# Patient Record
Sex: MALE | Race: WHITE | Employment: FULL TIME | URBAN - METROPOLITAN AREA
[De-identification: names, ages, dates, MRNs, and addresses within clinical notes are randomized per-mention and may not be internally consistent; named-entity substitution may affect disease eponyms.]

---

## 2020-02-18 ENCOUNTER — OFFICE VISIT (OUTPATIENT)
Dept: PODIATRY CLINIC | Facility: CLINIC | Age: 45
End: 2020-02-18
Payer: COMMERCIAL

## 2020-02-18 DIAGNOSIS — M79.671 RIGHT FOOT PAIN: ICD-10-CM

## 2020-02-18 DIAGNOSIS — M77.41 METATARSALGIA OF BOTH FEET: ICD-10-CM

## 2020-02-18 DIAGNOSIS — M77.42 METATARSALGIA OF BOTH FEET: ICD-10-CM

## 2020-02-18 DIAGNOSIS — M79.672 LEFT FOOT PAIN: Primary | ICD-10-CM

## 2020-02-18 DIAGNOSIS — M20.5X1 HALLUX LIMITUS OF RIGHT FOOT: ICD-10-CM

## 2020-02-18 PROCEDURE — 73620 X-RAY EXAM OF FOOT: CPT | Performed by: PODIATRIST

## 2020-02-18 PROCEDURE — 99213 OFFICE O/P EST LOW 20 MIN: CPT | Performed by: PODIATRIST

## 2020-02-18 RX ORDER — LORAZEPAM 0.5 MG/1
TABLET ORAL AS NEEDED
COMMUNITY
Start: 2019-12-04

## 2020-02-18 RX ORDER — QUETIAPINE 100 MG/1
100 TABLET, FILM COATED ORAL NIGHTLY
COMMUNITY
End: 2020-07-29 | Stop reason: ALTCHOICE

## 2020-02-18 RX ORDER — CLONAZEPAM 1 MG/1
0.5 TABLET ORAL 2 TIMES DAILY
COMMUNITY
End: 2020-07-29 | Stop reason: ALTCHOICE

## 2020-02-18 RX ORDER — FLUOXETINE HYDROCHLORIDE 40 MG/1
40 CAPSULE ORAL DAILY
COMMUNITY

## 2020-02-18 NOTE — PROGRESS NOTES
Crissie Hatchet is a 38year old male. Patient presents with:   Foot Pain: bilateral foot pain - is off work now since he is having elbow surgery tomorrow, has been walking to keep in shape and his feet had been hurting - has old orthotics, but are not prov headaches  MUSCULO: denies arthritis, back pain      EXAM:   There were no vitals taken for this visit. Physical Exam  GENERAL: well developed, well nourished, in no apparent distress  EXTREMITIES:   1. Integument: Normal skin temperature and turgor  2.  V

## 2020-03-24 ENCOUNTER — OFFICE VISIT (OUTPATIENT)
Dept: PODIATRY CLINIC | Facility: CLINIC | Age: 45
End: 2020-03-24
Payer: COMMERCIAL

## 2020-03-24 DIAGNOSIS — M79.671 RIGHT FOOT PAIN: ICD-10-CM

## 2020-03-24 DIAGNOSIS — M77.41 METATARSALGIA OF BOTH FEET: ICD-10-CM

## 2020-03-24 DIAGNOSIS — M77.42 METATARSALGIA OF BOTH FEET: ICD-10-CM

## 2020-03-24 DIAGNOSIS — M79.672 LEFT FOOT PAIN: Primary | ICD-10-CM

## 2020-03-24 DIAGNOSIS — M20.5X1 HALLUX LIMITUS OF RIGHT FOOT: ICD-10-CM

## 2020-03-24 PROCEDURE — L3000 FT INSERT UCB BERKELEY SHELL: HCPCS | Performed by: PODIATRIST

## 2020-03-24 PROCEDURE — 29799 UNLISTED PX CASTING/STRPG: CPT | Performed by: PODIATRIST

## 2020-03-24 NOTE — PROGRESS NOTES
Cathy Mondragon is a 38year old male. Patient presents with:  Orthotic Status: here to get casted for orthotics.         HPI:     Presents today to be casted for his orthotic      Allergies: Vancomycin   Current Outpatient Medications   Medication Sig Disp Dorsalis pedis two out of four bilateral and posterior tibial pulses two out of   four bilateral, capillary refill normal.   3. Musculoskeletal: All muscle groups are graded 5 out of 5 in the foot and ankle.    4. Neurological: Normal sharp dull sensation;

## 2020-05-29 ENCOUNTER — TELEPHONE (OUTPATIENT)
Dept: PODIATRY CLINIC | Facility: CLINIC | Age: 45
End: 2020-05-29

## 2020-05-29 NOTE — TELEPHONE ENCOUNTER
Contacted patient to let her know orthotics are in the office. he will stop by the office and pick them up.

## 2020-06-01 ENCOUNTER — TELEPHONE (OUTPATIENT)
Dept: PODIATRY CLINIC | Facility: CLINIC | Age: 45
End: 2020-06-01

## 2020-06-01 NOTE — TELEPHONE ENCOUNTER
Patient stopped into the office to  his custom molded orthotics. He will return to the office for followup if he has any problems with the devices.

## 2020-07-29 ENCOUNTER — OFFICE VISIT (OUTPATIENT)
Dept: PODIATRY CLINIC | Facility: CLINIC | Age: 45
End: 2020-07-29
Payer: COMMERCIAL

## 2020-07-29 VITALS — SYSTOLIC BLOOD PRESSURE: 132 MMHG | DIASTOLIC BLOOD PRESSURE: 76 MMHG

## 2020-07-29 DIAGNOSIS — M79.672 LEFT FOOT PAIN: Primary | ICD-10-CM

## 2020-07-29 DIAGNOSIS — M72.2 PLANTAR FASCIITIS OF LEFT FOOT: ICD-10-CM

## 2020-07-29 PROCEDURE — 3075F SYST BP GE 130 - 139MM HG: CPT | Performed by: PODIATRIST

## 2020-07-29 PROCEDURE — 3078F DIAST BP <80 MM HG: CPT | Performed by: PODIATRIST

## 2020-07-29 PROCEDURE — 20550 NJX 1 TENDON SHEATH/LIGAMENT: CPT | Performed by: PODIATRIST

## 2020-07-29 RX ORDER — DEXAMETHASONE SODIUM PHOSPHATE 4 MG/ML
2 INJECTION, SOLUTION INTRA-ARTICULAR; INTRALESIONAL; INTRAMUSCULAR; INTRAVENOUS; SOFT TISSUE ONCE
Status: COMPLETED | OUTPATIENT
Start: 2020-07-29 | End: 2020-07-29

## 2020-08-05 NOTE — PROGRESS NOTES
Christophe Benson is a 41year old male. Patient presents with:  Heel Pain: Left heel pain. Patient having pain, 6/10. Orthotics are not helping. HPI:     This 73-year-old male patient presents to clinic today complaining of left heel pain.   He rates apparent distress  EXTREMITIES:   1. Integument: Normal skin temperature and turgor  2.  Vascular: Dorsalis pedis two out of four bilateral and posterior tibial pulses two out of   four bilateral, capillary refill normal.   4. Neurological: Normal sharp dul Fiona Guerrier, AME    7/29/2020

## 2020-08-26 ENCOUNTER — OFFICE VISIT (OUTPATIENT)
Dept: PODIATRY CLINIC | Facility: CLINIC | Age: 45
End: 2020-08-26
Payer: COMMERCIAL

## 2020-08-26 VITALS — SYSTOLIC BLOOD PRESSURE: 114 MMHG | DIASTOLIC BLOOD PRESSURE: 68 MMHG

## 2020-08-26 DIAGNOSIS — M72.2 PLANTAR FASCIITIS OF LEFT FOOT: Primary | ICD-10-CM

## 2020-08-26 DIAGNOSIS — M79.672 LEFT FOOT PAIN: ICD-10-CM

## 2020-08-26 PROCEDURE — 3074F SYST BP LT 130 MM HG: CPT | Performed by: PODIATRIST

## 2020-08-26 PROCEDURE — 20550 NJX 1 TENDON SHEATH/LIGAMENT: CPT | Performed by: PODIATRIST

## 2020-08-26 PROCEDURE — 3078F DIAST BP <80 MM HG: CPT | Performed by: PODIATRIST

## 2020-08-26 RX ORDER — CLONAZEPAM 1 MG/1
TABLET ORAL
COMMUNITY
Start: 2019-07-01

## 2020-08-26 RX ORDER — FAMOTIDINE 40 MG/1
TABLET, FILM COATED ORAL 2 TIMES DAILY
COMMUNITY
Start: 2020-08-18

## 2020-08-26 RX ORDER — ACETAMINOPHEN 160 MG
TABLET,DISINTEGRATING ORAL
COMMUNITY
Start: 2020-02-07

## 2020-08-26 RX ORDER — DEXAMETHASONE SODIUM PHOSPHATE 4 MG/ML
2 INJECTION, SOLUTION INTRA-ARTICULAR; INTRALESIONAL; INTRAMUSCULAR; INTRAVENOUS; SOFT TISSUE ONCE
Status: COMPLETED | OUTPATIENT
Start: 2020-08-26 | End: 2020-08-26

## 2020-08-26 RX ORDER — TRAZODONE HYDROCHLORIDE 150 MG/1
150 TABLET ORAL NIGHTLY
COMMUNITY
Start: 2020-08-18

## 2020-08-26 NOTE — PROGRESS NOTES
Ana Pink is a 41year old male. Patient presents with: Follow - Up: left heel pain - pain scale at worst 8/10. HPI:     This 29-year-old male patient returns to clinic today for follow-up of plantar fascial pain in his left foot.   He returns tod Number of children: Not on file      Years of education: Not on file      Highest education level: Not on file    Tobacco Use      Smoking status: Never Smoker      Smokeless tobacco: Never Used    Substance and Sexual Activity      Alcohol use: Never THERAPY & REHAB      Plan: Discussed the clinical findings with the patient along with the treatment options. Answered all of his questions to his understanding and satisfaction.   A cortisone injection (2) was given to the trigger point area on his left h

## 2020-08-26 NOTE — PROCEDURES
Per Dr Suri Hernandes to draw up .5ml of dexamethasone sodium phosphate, .5ml kenalog-10 and .5ml sesorcaine 0.5% for a left foot injection. Pt had left office before I could obtain post injection vitals.

## 2020-08-27 ENCOUNTER — TELEPHONE (OUTPATIENT)
Dept: PHYSICAL THERAPY | Age: 45
End: 2020-08-27

## 2020-09-08 ENCOUNTER — OFFICE VISIT (OUTPATIENT)
Dept: PHYSICAL THERAPY | Facility: HOSPITAL | Age: 45
End: 2020-09-08
Attending: PODIATRIST
Payer: COMMERCIAL

## 2020-09-08 DIAGNOSIS — M72.2 PLANTAR FASCIITIS OF LEFT FOOT: ICD-10-CM

## 2020-09-08 DIAGNOSIS — M79.672 LEFT FOOT PAIN: ICD-10-CM

## 2020-09-08 PROCEDURE — 97110 THERAPEUTIC EXERCISES: CPT

## 2020-09-08 PROCEDURE — 97162 PT EVAL MOD COMPLEX 30 MIN: CPT

## 2020-09-08 NOTE — PROGRESS NOTES
LOWER EXTREMITY EVALUATION:   Referring Physician: Dr. Myra Miller  Diagnosis: L plantar fasciitis     Date of Service: 9/8/2020     PATIENT SUMMARY   Doreatha Meckel is a 41year old male who presents to therapy today with complaints of L foot pain since mid-J correctly without reported pain. Skilled Physical Therapy is medically necessary to address the above impairments and reach functional goals.      Precautions:  None  OBJECTIVE:   Observation: unremarkable  Palpation: tender to distal plantar surface of zo without pain. Frequency / Duration: Patient will be seen for 1-2 x/week or a total of 16 visits over a 90 day period.  Treatment will include: Gait training, Manual Therapy, Neuromuscular Re-education, Therapeutic Activities, Therapeutic Exercise and Jaun

## 2020-09-11 ENCOUNTER — TELEPHONE (OUTPATIENT)
Dept: PHYSICAL THERAPY | Facility: HOSPITAL | Age: 45
End: 2020-09-11

## 2020-09-11 ENCOUNTER — APPOINTMENT (OUTPATIENT)
Dept: PHYSICAL THERAPY | Facility: HOSPITAL | Age: 45
End: 2020-09-11
Attending: PODIATRIST
Payer: COMMERCIAL

## 2020-09-15 ENCOUNTER — OFFICE VISIT (OUTPATIENT)
Dept: PHYSICAL THERAPY | Facility: HOSPITAL | Age: 45
End: 2020-09-15
Attending: PODIATRIST
Payer: COMMERCIAL

## 2020-09-15 PROCEDURE — 97140 MANUAL THERAPY 1/> REGIONS: CPT

## 2020-09-15 PROCEDURE — 97110 THERAPEUTIC EXERCISES: CPT

## 2020-09-15 NOTE — PROGRESS NOTES
Dx: L plantar fasciitis         Insurance (Authorized # of Visits):  [facility] 48 visits           Authorizing Physician: Dr. Jessica Alvarado  Next MD visit: none scheduled  Fall Risk: standard         Precautions: n/a             Subjective: Patient reports that last Tues

## 2020-09-16 ENCOUNTER — OFFICE VISIT (OUTPATIENT)
Dept: PODIATRY CLINIC | Facility: CLINIC | Age: 45
End: 2020-09-16
Payer: COMMERCIAL

## 2020-09-16 DIAGNOSIS — M79.672 LEFT FOOT PAIN: ICD-10-CM

## 2020-09-16 DIAGNOSIS — M72.2 PLANTAR FASCIITIS OF LEFT FOOT: Primary | ICD-10-CM

## 2020-09-16 PROCEDURE — 99213 OFFICE O/P EST LOW 20 MIN: CPT | Performed by: PODIATRIST

## 2020-09-16 NOTE — PROGRESS NOTES
Eloy Tello is a 41year old male. Patient presents with: Follow - Up: left heel, felt sharp shooting pain twice this week - pain scale 5/10.     HPI:     This 46-year-old male patient returns to clinic today for follow-up of plantar fascial pain in hi mid back      History reviewed. No pertinent family history.    Social History    Socioeconomic History      Marital status:       Spouse name: Not on file      Number of children: Not on file      Years of education: Not on file      Highest educati his home exercise program as developed through his physical therapy.   He is to continue to ice the affected area as directed, wear good supportive shoes with his custom orthotics, wear the night splint on his left nightly as directed, and to refrain from a

## 2020-09-18 ENCOUNTER — APPOINTMENT (OUTPATIENT)
Dept: PHYSICAL THERAPY | Facility: HOSPITAL | Age: 45
End: 2020-09-18
Attending: PODIATRIST
Payer: COMMERCIAL

## 2020-09-22 ENCOUNTER — OFFICE VISIT (OUTPATIENT)
Dept: PHYSICAL THERAPY | Facility: HOSPITAL | Age: 45
End: 2020-09-22
Attending: PODIATRIST
Payer: COMMERCIAL

## 2020-09-22 PROCEDURE — 97110 THERAPEUTIC EXERCISES: CPT

## 2020-09-22 PROCEDURE — 97140 MANUAL THERAPY 1/> REGIONS: CPT

## 2020-09-22 NOTE — PROGRESS NOTES
Dx: L plantar fasciitis         Insurance (Authorized # of Visits):  [facility] 48 visits           Authorizing Physician: Dr. Carl Ureña  Next MD visit: none scheduled  Fall Risk: standard         Precautions: n/a             Subjective: Heel pain comes on with sittin total                    HEP: calf stretching, SLS ball toss, slow heel raises    Charges: manual x1, therex x2       Total Timed Treatment: 44 min  Total Treatment Time: 44 min

## 2020-09-25 ENCOUNTER — OFFICE VISIT (OUTPATIENT)
Dept: PHYSICAL THERAPY | Facility: HOSPITAL | Age: 45
End: 2020-09-25
Attending: PODIATRIST
Payer: COMMERCIAL

## 2020-09-25 PROCEDURE — 97110 THERAPEUTIC EXERCISES: CPT

## 2020-09-25 PROCEDURE — 97140 MANUAL THERAPY 1/> REGIONS: CPT

## 2020-09-25 NOTE — PROGRESS NOTES
Dx: L plantar fasciitis         Insurance (Authorized # of Visits):  [facility] 48 visits           Authorizing Physician: Dr. Yomaira Anderson  Next MD visit: none scheduled  Fall Risk: standard         Precautions: n/a             Subjective: Patient is pretty sore this mo DKTC x20  Supine SKTC stretching 10 sec holds x3 BL  Standing trunk rotations with stick behind back x2'  Flexion stretch on railing x10  Calf stretch on incline 2x30 sec Therex:  SB lower trunk rotations x2'  SB DKTC x20  Calf stretch on incline 2x30 sec

## 2020-09-28 ENCOUNTER — TELEPHONE (OUTPATIENT)
Dept: PHYSICAL THERAPY | Facility: HOSPITAL | Age: 45
End: 2020-09-28

## 2020-09-29 ENCOUNTER — APPOINTMENT (OUTPATIENT)
Dept: PHYSICAL THERAPY | Facility: HOSPITAL | Age: 45
End: 2020-09-29
Attending: PODIATRIST
Payer: COMMERCIAL

## 2020-10-02 ENCOUNTER — APPOINTMENT (OUTPATIENT)
Dept: PHYSICAL THERAPY | Facility: HOSPITAL | Age: 45
End: 2020-10-02
Attending: PODIATRIST
Payer: COMMERCIAL

## 2020-10-08 ENCOUNTER — APPOINTMENT (OUTPATIENT)
Dept: LAB | Facility: HOSPITAL | Age: 45
End: 2020-10-08
Attending: PHYSICIAN ASSISTANT
Payer: COMMERCIAL

## 2020-10-08 ENCOUNTER — TELEPHONE (OUTPATIENT)
Dept: PHYSICAL THERAPY | Facility: HOSPITAL | Age: 45
End: 2020-10-08

## 2020-10-09 ENCOUNTER — APPOINTMENT (OUTPATIENT)
Dept: PHYSICAL THERAPY | Facility: HOSPITAL | Age: 45
End: 2020-10-09
Attending: PODIATRIST
Payer: COMMERCIAL

## 2020-10-13 ENCOUNTER — APPOINTMENT (OUTPATIENT)
Dept: PHYSICAL THERAPY | Facility: HOSPITAL | Age: 45
End: 2020-10-13
Attending: PODIATRIST
Payer: COMMERCIAL

## 2020-10-15 ENCOUNTER — TELEPHONE (OUTPATIENT)
Dept: PHYSICAL THERAPY | Facility: HOSPITAL | Age: 45
End: 2020-10-15

## 2020-10-16 ENCOUNTER — APPOINTMENT (OUTPATIENT)
Dept: PHYSICAL THERAPY | Facility: HOSPITAL | Age: 45
End: 2020-10-16
Attending: PODIATRIST
Payer: COMMERCIAL

## 2020-10-23 ENCOUNTER — OFFICE VISIT (OUTPATIENT)
Dept: PODIATRY CLINIC | Facility: CLINIC | Age: 45
End: 2020-10-23
Payer: COMMERCIAL

## 2020-10-23 DIAGNOSIS — M72.2 PLANTAR FASCIITIS OF LEFT FOOT: Primary | ICD-10-CM

## 2020-10-23 DIAGNOSIS — M79.672 LEFT FOOT PAIN: ICD-10-CM

## 2020-10-23 PROCEDURE — 99213 OFFICE O/P EST LOW 20 MIN: CPT | Performed by: PODIATRIST

## 2020-10-23 NOTE — PROGRESS NOTES
Pierre Khan is a 41year old male. Patient presents with: Follow - Up: left foot feel alot better - pain scale 3/10. HPI:     This 42-year-old male patient returns to clinic today for follow-up of plantar fascial pain in his left foot.    He returns Frequency: Never      Drug use: Never          REVIEW OF SYSTEMS:   Review of Systems    Today reviewed systems as documented below  GENERAL HEALTH: feels well otherwise  SKIN: denies any unusual skin lesions or rashes  RESPIRATORY: denies shortness of br symptoms/pain. He may return to work without restriction as long as he has no issues. We will fill out the necessary paperwork for work which he states he will . Filled out the required paperwork for work.   He will monitor the affected area and w

## 2020-11-03 ENCOUNTER — TELEPHONE (OUTPATIENT)
Dept: PHYSICAL THERAPY | Age: 45
End: 2020-11-03

## 2020-11-20 ENCOUNTER — OFFICE VISIT (OUTPATIENT)
Dept: PODIATRY CLINIC | Facility: CLINIC | Age: 45
End: 2020-11-20
Payer: COMMERCIAL

## 2020-11-20 VITALS — DIASTOLIC BLOOD PRESSURE: 72 MMHG | SYSTOLIC BLOOD PRESSURE: 128 MMHG

## 2020-11-20 DIAGNOSIS — M72.2 PLANTAR FASCIITIS OF LEFT FOOT: ICD-10-CM

## 2020-11-20 DIAGNOSIS — M25.571 RIGHT ANKLE PAIN, UNSPECIFIED CHRONICITY: Primary | ICD-10-CM

## 2020-11-20 DIAGNOSIS — S86.301A INJURY OF PERONEAL TENDON OF RIGHT FOOT, INITIAL ENCOUNTER: ICD-10-CM

## 2020-11-20 DIAGNOSIS — S82.891A CLOSED FRACTURE OF RIGHT ANKLE, INITIAL ENCOUNTER: ICD-10-CM

## 2020-11-20 DIAGNOSIS — T14.8XXA FRACTURE: ICD-10-CM

## 2020-11-20 PROCEDURE — 3078F DIAST BP <80 MM HG: CPT | Performed by: PODIATRIST

## 2020-11-20 PROCEDURE — 99072 ADDL SUPL MATRL&STAF TM PHE: CPT | Performed by: PODIATRIST

## 2020-11-20 PROCEDURE — 20550 NJX 1 TENDON SHEATH/LIGAMENT: CPT | Performed by: PODIATRIST

## 2020-11-20 PROCEDURE — 3074F SYST BP LT 130 MM HG: CPT | Performed by: PODIATRIST

## 2020-11-20 PROCEDURE — 99213 OFFICE O/P EST LOW 20 MIN: CPT | Performed by: PODIATRIST

## 2020-11-20 RX ORDER — DEXAMETHASONE SODIUM PHOSPHATE 4 MG/ML
2 INJECTION, SOLUTION INTRA-ARTICULAR; INTRALESIONAL; INTRAMUSCULAR; INTRAVENOUS; SOFT TISSUE ONCE
Status: COMPLETED | OUTPATIENT
Start: 2020-11-20 | End: 2020-11-20

## 2020-11-20 RX ADMIN — DEXAMETHASONE SODIUM PHOSPHATE 2 MG: 4 INJECTION, SOLUTION INTRA-ARTICULAR; INTRALESIONAL; INTRAMUSCULAR; INTRAVENOUS; SOFT TISSUE at 16:44:00

## 2020-11-20 NOTE — PROCEDURES
Per Dr Okeefe Him to draw up .5ml of dexamethasone sodium phosphate, .5ml kenalog-10 and .5ml sesorcaine 0.5% for a left foot injection. Pt had left office before I could obtain post injection vitals.

## 2020-11-20 NOTE — PROGRESS NOTES
Doreatha Meckel is a 41year old male. Patient presents with: Follow - Up: left foot pain came back next weekend - last Sunday patient sprained his right ankle, it is swollen - pain scale 6/10.      HPI:     This 71-year-old male patient returns to clinic Hemorrhoids 2008    Havent had in while   • History of depression 2008    Last few years   • History of mental disorder 2008    Aniexty, Panic attacks   • Indigestion 2000    On and off not consistent   • Pain with bowel movements 2000    Once every few mo heartburn  NEURO: denies headaches  MUSCULO: denies arthritis, back pain      EXAM:   /72 (BP Location: Right arm, Patient Position: Sitting, Cuff Size: adult)   Physical Exam  GENERAL: well developed, well nourished, in no apparent distress  EXTREMI Future  -     MRI FOOT+ANKLE, RIGHT (ALL [facility] ONLY) (CPT=73719/05799); Future    Closed fracture of right ankle, initial encounter    Fracture  -     Cancel: MRI FOOT+ANKLE, RIGHT (ALL [facility] ONLY) (CPT=73719/01342);  Future  -     CREATININE, SERUM; Futu drive and be on his feet for long periods of time until we can reassess after we receive the results of his MRI studies. He is to wear good supportive athletic shoe on his left.   He will monitor the affected areas on both sides and will inform the office

## 2020-11-23 ENCOUNTER — TELEPHONE (OUTPATIENT)
Dept: PODIATRY CLINIC | Facility: CLINIC | Age: 45
End: 2020-11-23

## 2020-11-23 NOTE — TELEPHONE ENCOUNTER
Called patient. Answered all of his questions to his understanding and satisfaction. He will schedule MRI for first available appointment. Patient states mood is helping but that he still has pain in his foot. He states he has been icing and elevating.

## 2020-11-25 ENCOUNTER — TELEPHONE (OUTPATIENT)
Dept: PODIATRY CLINIC | Facility: CLINIC | Age: 45
End: 2020-11-25

## 2020-11-25 RX ORDER — HYDROCODONE BITARTRATE AND ACETAMINOPHEN 5; 325 MG/1; MG/1
TABLET ORAL
Qty: 15 TABLET | Refills: 0 | Status: SHIPPED | OUTPATIENT
Start: 2020-11-25 | End: 2021-02-02

## 2020-11-27 ENCOUNTER — TELEPHONE (OUTPATIENT)
Dept: PODIATRY CLINIC | Facility: CLINIC | Age: 45
End: 2020-11-27

## 2020-11-27 ENCOUNTER — HOSPITAL ENCOUNTER (OUTPATIENT)
Dept: MRI IMAGING | Facility: HOSPITAL | Age: 45
Discharge: HOME | End: 2020-11-27
Attending: PODIATRIST
Payer: COMMERCIAL

## 2020-11-27 DIAGNOSIS — S86.301A INJURY OF PERONEAL TENDON OF RIGHT FOOT, INITIAL ENCOUNTER: ICD-10-CM

## 2020-11-27 DIAGNOSIS — M25.571 RIGHT ANKLE PAIN, UNSPECIFIED CHRONICITY: ICD-10-CM

## 2020-11-27 DIAGNOSIS — T14.8XXA FRACTURE: ICD-10-CM

## 2020-11-27 PROCEDURE — 73721 MRI JNT OF LWR EXTRE W/O DYE: CPT | Performed by: PODIATRIST

## 2020-11-27 PROCEDURE — 73718 MRI LOWER EXTREMITY W/O DYE: CPT | Performed by: PODIATRIST

## 2020-11-27 NOTE — TELEPHONE ENCOUNTER
Called patient informed of the results of his MRI studies of his right ankle performed today which showed a high-grade partial-thickness tear of his anterior talofibular and calcaneofibular ligaments without fracture.   Discussed the treatment plan with the

## 2020-11-30 ENCOUNTER — TELEPHONE (OUTPATIENT)
Dept: PHYSICAL THERAPY | Facility: HOSPITAL | Age: 45
End: 2020-11-30

## 2020-12-04 ENCOUNTER — APPOINTMENT (OUTPATIENT)
Dept: PHYSICAL THERAPY | Facility: HOSPITAL | Age: 45
End: 2020-12-04
Attending: PODIATRIST
Payer: COMMERCIAL

## 2020-12-08 ENCOUNTER — APPOINTMENT (OUTPATIENT)
Dept: PHYSICAL THERAPY | Facility: HOSPITAL | Age: 45
End: 2020-12-08
Attending: PODIATRIST
Payer: COMMERCIAL

## 2020-12-11 ENCOUNTER — OFFICE VISIT (OUTPATIENT)
Dept: PODIATRY CLINIC | Facility: CLINIC | Age: 45
End: 2020-12-11
Payer: COMMERCIAL

## 2020-12-11 ENCOUNTER — APPOINTMENT (OUTPATIENT)
Dept: PHYSICAL THERAPY | Facility: HOSPITAL | Age: 45
End: 2020-12-11
Attending: PODIATRIST
Payer: COMMERCIAL

## 2020-12-11 DIAGNOSIS — M25.561 RIGHT KNEE PAIN, UNSPECIFIED CHRONICITY: ICD-10-CM

## 2020-12-11 DIAGNOSIS — M25.571 RIGHT ANKLE PAIN, UNSPECIFIED CHRONICITY: ICD-10-CM

## 2020-12-11 DIAGNOSIS — S93.401A SPRAIN OF RIGHT ANKLE, UNSPECIFIED LIGAMENT, INITIAL ENCOUNTER: Primary | ICD-10-CM

## 2020-12-11 DIAGNOSIS — M72.2 PLANTAR FASCIITIS, LEFT: ICD-10-CM

## 2020-12-11 PROCEDURE — 99213 OFFICE O/P EST LOW 20 MIN: CPT | Performed by: PODIATRIST

## 2020-12-13 NOTE — PROGRESS NOTES
Christophe Benson is a 41year old male. Patient presents with: Follow - Up: Right ankle pain; plantar fasciitis left; right knee pain     HPI:     This 81-year-old male patient returns to clinic today for follow-up.   He states his pain is not any better an Decorative tattoo 2000    Left upper arm   • Diarrhea, unspecified 2000    Once every few months   • Heartburn 2000    On and off not consistent   • Hemorrhoids 2008    Havent had in while   • History of depression 2008    Last few years   • History of men rashes  RESPIRATORY: denies shortness of breath with exertion  CARDIOVASCULAR: denies chest pain on exertion  GI: denies abdominal pain and denies heartburn  NEURO: denies headaches  MUSCULO: denies arthritis, back pain      EXAM:   There were no vitals ta orthopedic consult for further evaluation and treatment of his right ankle sprain and of his right knee pain.   He is to continue to wear the cam walker on his right until he is able to get in to see an orthopedic physician for further evaluation and treatm

## 2020-12-14 ENCOUNTER — TELEPHONE (OUTPATIENT)
Dept: PODIATRY CLINIC | Facility: CLINIC | Age: 45
End: 2020-12-14

## 2020-12-14 RX ORDER — METHYLPREDNISOLONE 4 MG/1
TABLET ORAL
Qty: 21 TABLET | Refills: 0 | Status: SHIPPED | OUTPATIENT
Start: 2020-12-14 | End: 2021-02-02

## 2020-12-14 NOTE — TELEPHONE ENCOUNTER
Dr. Carl Ureña- please see message. Pt seen on 12/11 and your note says you gave a cortisone injection , but doesn't mention an rx for Medrol.

## 2020-12-15 ENCOUNTER — APPOINTMENT (OUTPATIENT)
Dept: PHYSICAL THERAPY | Facility: HOSPITAL | Age: 45
End: 2020-12-15
Attending: PODIATRIST
Payer: COMMERCIAL

## 2020-12-16 ENCOUNTER — OFFICE VISIT (OUTPATIENT)
Dept: ORTHOPEDICS CLINIC | Facility: CLINIC | Age: 45
End: 2020-12-16
Payer: COMMERCIAL

## 2020-12-16 ENCOUNTER — TELEPHONE (OUTPATIENT)
Dept: ORTHOPEDICS CLINIC | Facility: CLINIC | Age: 45
End: 2020-12-16

## 2020-12-16 VITALS — OXYGEN SATURATION: 99 % | HEIGHT: 68 IN | HEART RATE: 80 BPM | RESPIRATION RATE: 16 BRPM | BODY MASS INDEX: 35 KG/M2

## 2020-12-16 DIAGNOSIS — S93.411A SPRAIN OF CALCANEOFIBULAR LIGAMENT OF RIGHT ANKLE, INITIAL ENCOUNTER: Primary | ICD-10-CM

## 2020-12-16 PROCEDURE — 99203 OFFICE O/P NEW LOW 30 MIN: CPT | Performed by: ORTHOPAEDIC SURGERY

## 2020-12-16 NOTE — H&P
[facility] - ORTHOPEDICS  [facility] [id number]  [phone]     NEW PATIENT VISIT - HISTORY AND PHYSICAL EXAMINATION     Name: River Peguero   MRN: [id number]  Date: 12/16/2020     CC: Right ankle When I eat too fast it occurs   • Shortness of breath Sept 2020    for 3 weeks   • Stress 2008    This year hasnt helped   • Wears glasses 2001    Glasses   • Wheezing Sept 2020    for 3 weeks       PAST SURGICAL HX:  Past Surgical History:   Procedure La HENT: Negative for sore throat. Eyes: Negative for visual disturbance. Respiratory: Negative for shortness of breath. Cardiovascular: Negative for chest pain and leg swelling.    Gastrointestinal: Negative for abdominal pain, constipation, diarrhea, Mood and Affect: Mood normal.     Examination of the right ankle demonstrates:     He has mild discomfort along the lateral aspect of his ankle overlying the distal fibula. There is pain with resisted eversion of the ankle.   Otherwise he has maintained I provided him with a prescription for physical therapy. I counseled him that typically the time course first full symptomatic relief is 3 months. All questions were answered to his satisfaction. FOLLOW-UP:  Return to clinic on an as needed basis.

## 2020-12-18 ENCOUNTER — APPOINTMENT (OUTPATIENT)
Dept: PHYSICAL THERAPY | Facility: HOSPITAL | Age: 45
End: 2020-12-18
Attending: PODIATRIST
Payer: COMMERCIAL

## 2020-12-22 ENCOUNTER — APPOINTMENT (OUTPATIENT)
Dept: PHYSICAL THERAPY | Facility: HOSPITAL | Age: 45
End: 2020-12-22
Attending: PODIATRIST
Payer: COMMERCIAL

## 2020-12-28 ENCOUNTER — TELEPHONE (OUTPATIENT)
Dept: ORTHOPEDICS CLINIC | Facility: CLINIC | Age: 45
End: 2020-12-28

## 2020-12-28 DIAGNOSIS — M25.562 ACUTE PAIN OF LEFT KNEE: Primary | ICD-10-CM

## 2020-12-28 NOTE — TELEPHONE ENCOUNTER
Patient stated that Dr. Ruchi Luz patient pt for his RT side and now that he's been over compensating with his LT knee/LT foot and his Physical Therapist is requesting an order to treat the LT knee as well.     Patient can be reached @ [phone]

## 2020-12-29 ENCOUNTER — APPOINTMENT (OUTPATIENT)
Dept: PHYSICAL THERAPY | Facility: HOSPITAL | Age: 45
End: 2020-12-29
Attending: PODIATRIST
Payer: COMMERCIAL

## 2020-12-30 ENCOUNTER — TELEPHONE (OUTPATIENT)
Dept: ORTHOPEDICS CLINIC | Facility: CLINIC | Age: 45
End: 2020-12-30

## 2020-12-30 NOTE — TELEPHONE ENCOUNTER
Most Recent PT order sent to [facility] in [facility] with fax confirmation received.

## 2020-12-30 NOTE — TELEPHONE ENCOUNTER
Per Physical Therapist, patient is requesting a PT order for his left knee & foot. This is his 2nd request. He has a PT appt today at 10:30am with [facility] [place] in [address] [place] [address].

## 2021-01-01 ENCOUNTER — MED REC SCAN ONLY (OUTPATIENT)
Dept: ORTHOPEDICS CLINIC | Facility: CLINIC | Age: 46
End: 2021-01-01

## 2021-01-05 ENCOUNTER — APPOINTMENT (OUTPATIENT)
Dept: PHYSICAL THERAPY | Facility: HOSPITAL | Age: 46
End: 2021-01-05
Attending: PODIATRIST
Payer: COMMERCIAL

## 2021-01-08 ENCOUNTER — APPOINTMENT (OUTPATIENT)
Dept: PHYSICAL THERAPY | Facility: HOSPITAL | Age: 46
End: 2021-01-08
Attending: PODIATRIST
Payer: COMMERCIAL

## 2021-02-02 ENCOUNTER — OFFICE VISIT (OUTPATIENT)
Dept: ORTHOPEDICS CLINIC | Facility: CLINIC | Age: 46
End: 2021-02-02
Payer: COMMERCIAL

## 2021-02-02 VITALS — OXYGEN SATURATION: 98 % | HEART RATE: 89 BPM

## 2021-02-02 DIAGNOSIS — M72.2 PLANTAR FASCIITIS: ICD-10-CM

## 2021-02-02 DIAGNOSIS — S93.411D SPRAIN OF CALCANEOFIBULAR LIGAMENT OF RIGHT ANKLE, SUBSEQUENT ENCOUNTER: Primary | ICD-10-CM

## 2021-02-02 PROCEDURE — 99213 OFFICE O/P EST LOW 20 MIN: CPT | Performed by: ORTHOPAEDIC SURGERY

## 2021-02-02 RX ORDER — MELOXICAM 15 MG/1
15 TABLET ORAL DAILY
Qty: 14 TABLET | Refills: 1 | Status: SHIPPED | OUTPATIENT
Start: 2021-02-02 | End: 2021-03-01

## 2021-02-02 NOTE — PROGRESS NOTES
[facility]  [address] [phone]     Name: Charles Dobson   MRN: [id number]  Date: 2/2/2021     REASON FOR VISIT: Follow-up evaluation for right ankle pain and new onset swelling along the posterior aspect of the knee. Normal stability to anterior, posterior and coronal stresses. There is pain at the plantar fascia with ankle dorsiflexion.     Examination of the right ankle demonstrates mild discomfort overlying the dista

## 2021-03-01 RX ORDER — MELOXICAM 15 MG/1
TABLET ORAL
Qty: 14 TABLET | Refills: 1 | Status: SHIPPED | OUTPATIENT
Start: 2021-03-01 | End: 2021-08-04 | Stop reason: ALTCHOICE

## 2021-03-01 NOTE — TELEPHONE ENCOUNTER
Medication(s) to Refill:   Requested Prescriptions     Pending Prescriptions Disp Refills   • MELOXICAM 15 MG Oral Tab [Pharmacy Med Name: MELOXICAM 15MG TABLETS] 14 tablet 1     Sig: TAKE 1 TABLET(15 MG) BY MOUTH DAILY         Reason for Medication Refill

## 2021-03-10 ENCOUNTER — TELEPHONE (OUTPATIENT)
Dept: ORTHOPEDICS CLINIC | Facility: CLINIC | Age: 46
End: 2021-03-10

## 2021-03-11 ENCOUNTER — TELEPHONE (OUTPATIENT)
Dept: ORTHOPEDICS CLINIC | Facility: CLINIC | Age: 46
End: 2021-03-11

## 2021-03-11 NOTE — TELEPHONE ENCOUNTER
Pt stated that he is going back to work on Monday 03.15 and he needs a note to go back to work.      I advised pt that Cathy Zheng is out of the office until 03.22 however, patient would like to speak with someone to see if there is anything he can get in the [facility]

## 2021-03-11 NOTE — TELEPHONE ENCOUNTER
Ridge Andrade,   Please write a note that states Mr. Isidro Hay can return to work without restrictions. Thanks!

## 2021-03-21 ENCOUNTER — IMMUNIZATION (OUTPATIENT)
Dept: LAB | Age: 46
End: 2021-03-21
Attending: HOSPITALIST
Payer: COMMERCIAL

## 2021-03-21 DIAGNOSIS — Z23 NEED FOR VACCINATION: Primary | ICD-10-CM

## 2021-03-21 PROCEDURE — 0001A SARSCOV2 VAC 30MCG/0.3ML IM: CPT

## 2021-04-01 ENCOUNTER — MED REC SCAN ONLY (OUTPATIENT)
Dept: ORTHOPEDICS CLINIC | Facility: CLINIC | Age: 46
End: 2021-04-01

## 2021-04-06 ENCOUNTER — TELEPHONE (OUTPATIENT)
Dept: ORTHOPEDICS CLINIC | Facility: CLINIC | Age: 46
End: 2021-04-06

## 2021-04-06 NOTE — TELEPHONE ENCOUNTER
Received Progress Eval from DPT dated 03/02 from clinical staff; signed by provider, fax date 03/25; sent to scanning

## 2021-04-11 ENCOUNTER — IMMUNIZATION (OUTPATIENT)
Dept: LAB | Age: 46
End: 2021-04-11
Attending: HOSPITALIST
Payer: COMMERCIAL

## 2021-04-11 DIAGNOSIS — Z23 NEED FOR VACCINATION: Primary | ICD-10-CM

## 2021-04-11 PROCEDURE — 0002A SARSCOV2 VAC 30MCG/0.3ML IM: CPT

## 2021-04-28 ENCOUNTER — OFFICE VISIT (OUTPATIENT)
Dept: PODIATRY CLINIC | Facility: CLINIC | Age: 46
End: 2021-04-28
Payer: COMMERCIAL

## 2021-04-28 DIAGNOSIS — M72.2 PLANTAR FASCIITIS OF LEFT FOOT: Primary | ICD-10-CM

## 2021-04-28 DIAGNOSIS — M72.2 PLANTAR FASCIITIS OF LEFT FOOT: ICD-10-CM

## 2021-04-28 DIAGNOSIS — M79.672 LEFT FOOT PAIN: ICD-10-CM

## 2021-04-28 PROCEDURE — 99213 OFFICE O/P EST LOW 20 MIN: CPT | Performed by: PODIATRIST

## 2021-04-28 RX ORDER — MELOXICAM 15 MG/1
15 TABLET ORAL DAILY
Qty: 30 TABLET | Refills: 0 | Status: SHIPPED | OUTPATIENT
Start: 2021-04-28 | End: 2021-04-29

## 2021-04-28 RX ORDER — METHYLPREDNISOLONE 4 MG/1
TABLET ORAL
Qty: 1 PACKAGE | Refills: 0 | Status: SHIPPED | OUTPATIENT
Start: 2021-04-28 | End: 2021-08-04 | Stop reason: ALTCHOICE

## 2021-04-28 NOTE — PROGRESS NOTES
Pierre Khan is a 41year old male. Patient presents with: Follow - Up: Plantar fasciitis left    HPI:     This 41-year-old male patient returns to clinic today for plantar fascial pain in his left foot.   He states that his right ankle is much better w History of depression 2008    Last few years   • History of mental disorder 2008    Aniexty, Panic attacks   • Indigestion 2000    On and off not consistent   • Pain with bowel movements 2000    Once every few months brings on cold sweat   • Painful swallo headaches  MUSCULO: denies arthritis, back pain      EXAM:   There were no vitals taken for this visit. Physical Exam  GENERAL: well developed, well nourished, in no apparent distress  EXTREMITIES:   1. Integument: Normal skin temperature and turgor.   2. take out all of the inserts that come with the shoe before putting in his custom made full-length orthotic devices into the boots/shoes.   He is to ice the affected area as directed, elevate when off of his feet, avoid going barefoot/wearing backless sandal

## 2021-04-29 RX ORDER — MELOXICAM 15 MG/1
TABLET ORAL
Qty: 90 TABLET | Refills: 0 | Status: SHIPPED | OUTPATIENT
Start: 2021-04-29

## 2021-04-29 NOTE — TELEPHONE ENCOUNTER
Rx request for 90 day supply of Meloxicam 15 mg, please review and sign off if appropriate. Thank you.     Last office visit: 4/28/21  Last refill: 4/28/21 # 30 with 0 refills

## 2021-05-12 ENCOUNTER — PATIENT MESSAGE (OUTPATIENT)
Dept: PODIATRY CLINIC | Facility: CLINIC | Age: 46
End: 2021-05-12

## 2021-05-13 NOTE — TELEPHONE ENCOUNTER
Patient has a follow up scheduled on 5/19/21 for cortisone injection as described in plan of care from [address] [place] [address]. Please see question below regarding working after the injection. Thank you.

## 2021-05-13 NOTE — TELEPHONE ENCOUNTER
From: Sukhwinder Sidhu  To: Alejandro Rodriguez DPM  Sent: 5/12/2021 9:46 PM CDT  Subject: Visit Soni Alvarado,    I was wondering after next week's injection how long do I need to be off work for? Thanks for your time.     Sincerely  Colonel Nicholson

## 2021-05-14 NOTE — TELEPHONE ENCOUNTER
Responded to patient's message in my chart. Also called patient and left a message for him to call back.

## 2021-05-19 ENCOUNTER — OFFICE VISIT (OUTPATIENT)
Dept: PODIATRY CLINIC | Facility: CLINIC | Age: 46
End: 2021-05-19
Payer: COMMERCIAL

## 2021-05-19 VITALS — SYSTOLIC BLOOD PRESSURE: 118 MMHG | DIASTOLIC BLOOD PRESSURE: 68 MMHG

## 2021-05-19 DIAGNOSIS — M72.2 PLANTAR FASCIITIS OF LEFT FOOT: Primary | ICD-10-CM

## 2021-05-19 DIAGNOSIS — M79.672 LEFT FOOT PAIN: ICD-10-CM

## 2021-05-19 PROCEDURE — 99213 OFFICE O/P EST LOW 20 MIN: CPT | Performed by: PODIATRIST

## 2021-05-19 PROCEDURE — 3078F DIAST BP <80 MM HG: CPT | Performed by: PODIATRIST

## 2021-05-19 PROCEDURE — 3074F SYST BP LT 130 MM HG: CPT | Performed by: PODIATRIST

## 2021-05-19 PROCEDURE — 20550 NJX 1 TENDON SHEATH/LIGAMENT: CPT | Performed by: PODIATRIST

## 2021-05-19 RX ORDER — DEXAMETHASONE SODIUM PHOSPHATE 4 MG/ML
2 INJECTION, SOLUTION INTRA-ARTICULAR; INTRALESIONAL; INTRAMUSCULAR; INTRAVENOUS; SOFT TISSUE ONCE
Status: COMPLETED | OUTPATIENT
Start: 2021-05-19 | End: 2021-05-19

## 2021-05-19 RX ORDER — MELOXICAM 15 MG/1
15 TABLET ORAL DAILY
Qty: 30 TABLET | Refills: 0 | Status: SHIPPED | OUTPATIENT
Start: 2021-05-19 | End: 2021-07-26

## 2021-05-19 NOTE — PROGRESS NOTES
Per Dr Jessica Alvarado to draw up .5ml of dexamethasone sodium phosphate, .5ml kenalog-10 and .5ml marcaine 0.5% for a left foot injection. Pt had left office before I could obtain post injection vitals.

## 2021-05-19 NOTE — PROGRESS NOTES
Crissie Hatchet is a 41year old male. Patient presents with: Follow - Up: Plantar fasciitis left - continued    HPI:     This 78-year-old male patient returns to clinic today for follow-up of plantar fascial pain in his left foot.   He returns today with Left upper arm   • Diarrhea, unspecified 2000    Once every few months   • Heartburn 2000    On and off not consistent   • Hemorrhoids 2008    Havent had in while   • History of depression 2008    Last few years   • History of mental disorder 2008    Aniex rashes  RESPIRATORY: denies shortness of breath with exertion  CARDIOVASCULAR: denies chest pain on exertion  GI: denies abdominal pain and denies heartburn  NEURO: denies headaches  MUSCULO: denies arthritis, back pain      EXAM:   /68 (BP Location: and a Band-Aid was applied to the injection site. Patient tolerated the injection and cold therapy well. He may remove the Band-Aid later today.   He is to ice the affected area as directed, do stretching exercises as directed, wear the over-the-counter n

## 2021-06-25 ENCOUNTER — OFFICE VISIT (OUTPATIENT)
Dept: PODIATRY CLINIC | Facility: CLINIC | Age: 46
End: 2021-06-25
Payer: COMMERCIAL

## 2021-06-25 DIAGNOSIS — M79.672 LEFT FOOT PAIN: ICD-10-CM

## 2021-06-25 DIAGNOSIS — M72.2 PLANTAR FASCIITIS, LEFT: Primary | ICD-10-CM

## 2021-06-25 PROCEDURE — 99213 OFFICE O/P EST LOW 20 MIN: CPT | Performed by: PODIATRIST

## 2021-07-01 NOTE — PROGRESS NOTES
Crissie Hatchet is a 40year old male. Patient presents with: Follow - Up: Plantar fasciitis left - better    HPI:     This 43-year-old male patient returns to clinic today for follow-up of plantar fascial pain in his left foot.   He returns today with imp being treated for it.    • Back pain 2005    Surgery in 2006   • Black stools 1999    Only time   • Bloating 1995    Nothing consistent   • Blood in the stool 1999    Only time   • Blurred vision Whole life    legally blind in right eye   • Chronic cough Se Substance and Sexual Activity      Alcohol use: Not Currently        Comment: Not with the medication I'm on.       Drug use: Never          REVIEW OF SYSTEMS:   Review of Systems    Today reviewed systems as documented below  GENERAL HEALTH: feels well oth not limited to extended periods of walking/standing, bending/stooping, heavy lifting, or any high impact/exercise/aerobic-like activity.   Reviewed proper shoe gear with the patient reminding him that he needs to wear shoes which give him good cushioning, s

## 2021-07-26 RX ORDER — MELOXICAM 15 MG/1
TABLET ORAL
Qty: 30 TABLET | Refills: 0 | OUTPATIENT
Start: 2021-07-26

## 2021-07-26 RX ORDER — MELOXICAM 15 MG/1
TABLET ORAL
Qty: 30 TABLET | Refills: 0 | Status: SHIPPED | OUTPATIENT
Start: 2021-07-26 | End: 2021-09-24

## 2021-07-26 NOTE — TELEPHONE ENCOUNTER
Dr Leo Mortimer,    Pt requested a refill for Meloxicam 15mg several times. His LOV was 6/25/21.   Next office visit 8/4/21  Please review and sign off if appropriate  Thank you

## 2021-08-04 ENCOUNTER — OFFICE VISIT (OUTPATIENT)
Dept: PODIATRY CLINIC | Facility: CLINIC | Age: 46
End: 2021-08-04
Payer: COMMERCIAL

## 2021-08-04 DIAGNOSIS — M72.2 PLANTAR FASCIITIS, LEFT: Primary | ICD-10-CM

## 2021-08-04 DIAGNOSIS — M79.672 LEFT FOOT PAIN: ICD-10-CM

## 2021-08-04 PROCEDURE — 20550 NJX 1 TENDON SHEATH/LIGAMENT: CPT | Performed by: PODIATRIST

## 2021-08-04 RX ORDER — DEXAMETHASONE SODIUM PHOSPHATE 4 MG/ML
2 INJECTION, SOLUTION INTRA-ARTICULAR; INTRALESIONAL; INTRAMUSCULAR; INTRAVENOUS; SOFT TISSUE ONCE
Status: COMPLETED | OUTPATIENT
Start: 2021-08-04 | End: 2021-08-04

## 2021-08-04 NOTE — PROGRESS NOTES
Sean Joe is a 40year old male. Patient presents with: Follow - Up: Recurrent plantar fasciitis left    HPI:     This 79-year-old male patient returns to clinic today for follow-up of plantar fascial pain in his left foot.   He states that his plant Surgery in 2006   • Black stools 1999    Only time   • Bloating 1995    Nothing consistent   • Blood in the stool 1999    Only time   • Blurred vision Whole life    legally blind in right eye   • Chronic cough Sept 2020    for 3 weeks   • Decorative tat Not Currently        Comment: Not with the medication I'm on.       Drug use: Never          REVIEW OF SYSTEMS:   Review of Systems    Today reviewed systems as documented below  GENERAL HEALTH: feels well otherwise  SKIN: denies any unusual skin lesions or the injection site. Patient tolerated the injection and cold therapy well.   He is to ice the affected area as directed, do stretching exercises as directed, wear the over-the-counter night splint as directed (if he finds it helps), avoid going barefoot/we

## 2021-08-16 ENCOUNTER — TELEPHONE (OUTPATIENT)
Dept: PODIATRY CLINIC | Facility: CLINIC | Age: 46
End: 2021-08-16

## 2021-08-16 NOTE — TELEPHONE ENCOUNTER
Patient called to see if he needed to wait to return to work until he saw Dr. Manuel Mathew at his appointment scheduled for Wednesday, August 18th or if he was able to return to work tomorrow.

## 2021-08-16 NOTE — TELEPHONE ENCOUNTER
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 Or do you want pt to wait until seen on 8/18?

## 2021-08-17 NOTE — TELEPHONE ENCOUNTER
Patient called back this date stating he has an appointment with Dr. Carl Ureña tomorrow and has already taken off today and tomorrow so when he presents to clinic tomorrow, he would like to be given a RTW at that time.

## 2021-08-17 NOTE — TELEPHONE ENCOUNTER
Praneeth Boykin for patient to return to work with the restriction that he should sit for 5-10 minutes at the end of each hour. Please inform him. Thank you.

## 2021-08-20 ENCOUNTER — OFFICE VISIT (OUTPATIENT)
Dept: PODIATRY CLINIC | Facility: CLINIC | Age: 46
End: 2021-08-20
Payer: COMMERCIAL

## 2021-08-20 DIAGNOSIS — M79.672 LEFT FOOT PAIN: ICD-10-CM

## 2021-08-20 DIAGNOSIS — M72.2 PLANTAR FASCIITIS, LEFT: Primary | ICD-10-CM

## 2021-08-20 PROCEDURE — 99213 OFFICE O/P EST LOW 20 MIN: CPT | Performed by: PODIATRIST

## 2021-08-20 RX ORDER — FLUOXETINE HYDROCHLORIDE 20 MG/1
CAPSULE ORAL
COMMUNITY
Start: 2021-08-04

## 2021-08-20 RX ORDER — CEFUROXIME AXETIL 250 MG/1
250 TABLET ORAL EVERY 12 HOURS
COMMUNITY
Start: 2021-08-17

## 2021-08-20 RX ORDER — BUDESONIDE AND FORMOTEROL FUMARATE DIHYDRATE 160; 4.5 UG/1; UG/1
2 AEROSOL RESPIRATORY (INHALATION) 2 TIMES DAILY
COMMUNITY
Start: 2021-08-17

## 2021-09-19 NOTE — PROGRESS NOTES
Preston Berg is a 40year old male. Patient presents with: Follow - Up: Recurrent plantar fasciitis left    HPI:     This 13-year-old male patient returns to clinic today for follow-up of plantar fascial pain in his left foot.   He returns today doing w Only time   • Blurred vision Whole life    legally blind in right eye   • Chronic cough Sept 2020    for 3 weeks   • Decorative tattoo 2000    Left upper arm   • Diarrhea, unspecified 2000    Once every few months   • Heartburn 2000    On and off not consi rashes  RESPIRATORY: denies shortness of breath with exertion  CARDIOVASCULAR: denies chest pain on exertion  GI: denies abdominal pain and denies heartburn  NEURO: denies headaches  MUSCULO: denies arthritis, back pain      EXAM:   There were no vitals ta He finished the course of meloxicam (Mobic) 15 mg 1 tablet daily as directed which he states that he tolerates well. He was reminded to discontinue the meloxicam with any GI upset and/or any other associated issues.   He was also reminded not take with an

## 2021-09-24 ENCOUNTER — OFFICE VISIT (OUTPATIENT)
Dept: PODIATRY CLINIC | Facility: CLINIC | Age: 46
End: 2021-09-24
Payer: COMMERCIAL

## 2021-09-24 DIAGNOSIS — M62.9 NONTRAUMATIC TEAR OF PLANTAR FASCIA: ICD-10-CM

## 2021-09-24 DIAGNOSIS — M72.2 PLANTAR FASCIITIS, LEFT: ICD-10-CM

## 2021-09-24 DIAGNOSIS — M77.32 CALCANEAL SPUR OF LEFT FOOT: ICD-10-CM

## 2021-09-24 DIAGNOSIS — M79.672 LEFT FOOT PAIN: Primary | ICD-10-CM

## 2021-09-24 PROCEDURE — 99213 OFFICE O/P EST LOW 20 MIN: CPT | Performed by: PODIATRIST

## 2021-09-24 NOTE — PROGRESS NOTES
Oscar Cortez is a 40year old male. Patient presents with: Follow - Up: Recurrent plantar fasciitis left    HPI:     This 41-year-old male patient returns to clinic today for follow-up of plantar fascial pain in his left foot.   He returns today with ov Take by mouth. • Budesonide-Formoterol Fumarate 160-4.5 MCG/ACT Inhalation Aerosol Inhale 2 puffs into the lungs 2 (two) times daily.  (Patient not taking: Reported on 9/24/2021)        Past Medical History:   Diagnosis Date   • Abdominal pain 1998    O Smoking status: Never Smoker      Smokeless tobacco: Never Used      Tobacco comment: Handful on cigars over the years    Vaping Use      Vaping Use: Never used    Substance and Sexual Activity      Alcohol use: Not Currently        Comment: Not with the m COMPLETE (TTL=08289); Future    Calcaneal spur of left foot  -     US FOOT LEFT COMPLETE (ZZP=68273); Future    Nontraumatic tear of plantar fascia  -     US FOOT LEFT COMPLETE (UGO=67544);  Future      Plan: Discussed the clinical findings with the patient AME    9/20/2021

## 2021-10-19 ENCOUNTER — HOSPITAL ENCOUNTER (OUTPATIENT)
Dept: GENERAL RADIOLOGY | Age: 46
Discharge: HOME | End: 2021-10-19
Attending: PHYSICIAN ASSISTANT
Payer: COMMERCIAL

## 2021-10-19 DIAGNOSIS — R50.9 FEVER: ICD-10-CM

## 2021-10-19 DIAGNOSIS — J10.1 INFLUENZA B: ICD-10-CM

## 2021-10-19 DIAGNOSIS — R05.9 COUGH: ICD-10-CM

## 2021-10-19 PROCEDURE — 71046 X-RAY EXAM CHEST 2 VIEWS: CPT | Performed by: PHYSICIAN ASSISTANT

## 2021-11-24 ENCOUNTER — TELEPHONE (OUTPATIENT)
Dept: PODIATRY CLINIC | Facility: CLINIC | Age: 46
End: 2021-11-24

## 2021-11-25 NOTE — TELEPHONE ENCOUNTER
Called patient and informed of the results of his diagnostic ultrasound studies. Patient states that he continues to have plantar fascial pain in his left foot. 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

## 2021-12-01 ENCOUNTER — OFFICE VISIT (OUTPATIENT)
Dept: PODIATRY CLINIC | Facility: CLINIC | Age: 46
End: 2021-12-01
Payer: COMMERCIAL

## 2021-12-01 DIAGNOSIS — M72.2 PLANTAR FASCIITIS, LEFT: ICD-10-CM

## 2021-12-01 DIAGNOSIS — M79.672 LEFT FOOT PAIN: Primary | ICD-10-CM

## 2021-12-01 DIAGNOSIS — M77.32 CALCANEAL SPUR OF LEFT FOOT: ICD-10-CM

## 2021-12-01 PROCEDURE — 99213 OFFICE O/P EST LOW 20 MIN: CPT | Performed by: PODIATRIST

## 2021-12-02 ENCOUNTER — PATIENT MESSAGE (OUTPATIENT)
Dept: PODIATRY CLINIC | Facility: CLINIC | Age: 46
End: 2021-12-02

## 2021-12-03 RX ORDER — METHYLPREDNISOLONE 4 MG/1
TABLET ORAL
Qty: 21 TABLET | Refills: 0 | Status: SHIPPED | OUTPATIENT
Start: 2021-12-03

## 2021-12-03 NOTE — TELEPHONE ENCOUNTER
Patient obtained clearance from his PCP for Medrol Dosepak. E-scribed a prescription for a Medrol Dosepak with instructions to take as directed. He is to discontinue the medication with any GI upset or other associated issues.  In addition, he is not to alex

## 2021-12-03 NOTE — TELEPHONE ENCOUNTER
From: Viviana Cheng  To: Stanislav Sneed DPM  Sent: 12/2/2021 6:05 PM CST  Subject: Steroid dose pack    Hi Dr. Christie Mendez,  My primary doctor said you can prescribe me the steroids. Thanks again. Good [place].     Sincerely   Ok Puentes

## 2021-12-31 NOTE — PROGRESS NOTES
Jina Aranda is a 40year old male. Patient presents with: Follow - Up: Recurrent plantar fasciitis left    HPI:     This 44-year-old male patient returns to clinic today for follow-up of plantar fascial pain in his left foot.   He had a diagnostic ultr 40 MG Oral Cap Take 40 mg by mouth daily. • LORazepam 0.5 MG Oral Tab as needed. • Probiotic Product (PROBIOTIC-10 OR) Take by mouth. • Multiple Vitamins-Minerals (MULTIVITAMIN ADULT EXTRA C OR) Take by mouth.      • methylPREDNISolone 4 MG Or • Heart Attack Paternal Grandfather    • Heart Attack Maternal Grandfather    • Mental Disorder Brother    • Mental Disorder Paternal Grandmother       Social History    Socioeconomic History      Marital status:     Tobacco Use      Smoking stat or chronic defects or soft tissue masses. Moderate thinning of the subcalcaneal fat pad is also noted bilaterally.       ASSESSMENT AND PLAN:   Diagnoses and all orders for this visit:    Left foot pain    Plantar fasciitis, left    Calcaneal spur of left office if signs/symptoms worsen and/or if he experiences any new acute issues. The patient indicates understanding of these issues and agrees to the plan. He will make an appointment for surgical consult with Dr. Nida Brock.   He will call the office and r

## 2022-01-31 ENCOUNTER — LAB ENCOUNTER (OUTPATIENT)
Dept: LAB | Age: 47
End: 2022-01-31
Attending: STUDENT IN AN ORGANIZED HEALTH CARE EDUCATION/TRAINING PROGRAM
Payer: COMMERCIAL

## 2022-01-31 DIAGNOSIS — R74.8 ELEVATED LIVER ENZYMES: ICD-10-CM

## 2022-01-31 LAB
ALBUMIN SERPL-MCNC: 3.9 G/DL (ref 3.4–5)
ALBUMIN/GLOB SERPL: 1.3 {RATIO} (ref 1–2)
ALP LIVER SERPL-CCNC: 60 U/L
ALT SERPL-CCNC: 46 U/L
ANION GAP SERPL CALC-SCNC: 6 MMOL/L (ref 0–18)
AST SERPL-CCNC: 23 U/L (ref 15–37)
BASOPHILS # BLD AUTO: 0.08 X10(3) UL (ref 0–0.2)
BASOPHILS NFR BLD AUTO: 0.9 %
BILIRUB SERPL-MCNC: 1.3 MG/DL (ref 0.1–2)
BUN BLD-MCNC: 15 MG/DL (ref 7–18)
CALCIUM BLD-MCNC: 9.4 MG/DL (ref 8.5–10.1)
CHLORIDE SERPL-SCNC: 105 MMOL/L (ref 98–112)
CO2 SERPL-SCNC: 29 MMOL/L (ref 21–32)
CREAT BLD-MCNC: 1.18 MG/DL
DEPRECATED HBV CORE AB SER IA-ACNC: 131.3 NG/ML
EOSINOPHIL # BLD AUTO: 0.25 X10(3) UL (ref 0–0.7)
EOSINOPHIL NFR BLD AUTO: 2.7 %
ERYTHROCYTE [DISTWIDTH] IN BLOOD BY AUTOMATED COUNT: 12.7 %
FASTING STATUS PATIENT QL REPORTED: YES
GLOBULIN PLAS-MCNC: 3.1 G/DL (ref 2.8–4.4)
GLUCOSE BLD-MCNC: 106 MG/DL (ref 70–99)
HCT VFR BLD AUTO: 51.6 %
HGB BLD-MCNC: 17.5 G/DL
IGA SERPL-MCNC: 185 MG/DL (ref 70–312)
IMM GRANULOCYTES # BLD AUTO: 0.03 X10(3) UL (ref 0–1)
IMM GRANULOCYTES NFR BLD: 0.3 %
IRON SATN MFR SERPL: 24 %
IRON SERPL-MCNC: 86 UG/DL
LYMPHOCYTES # BLD AUTO: 3.78 X10(3) UL (ref 1–4)
LYMPHOCYTES NFR BLD AUTO: 41.5 %
MCH RBC QN AUTO: 31.2 PG (ref 26–34)
MCHC RBC AUTO-ENTMCNC: 33.9 G/DL (ref 31–37)
MCV RBC AUTO: 92 FL
MONOCYTES # BLD AUTO: 0.87 X10(3) UL (ref 0.1–1)
MONOCYTES NFR BLD AUTO: 9.6 %
NEUTROPHILS # BLD AUTO: 4.09 X10 (3) UL (ref 1.5–7.7)
NEUTROPHILS # BLD AUTO: 4.09 X10(3) UL (ref 1.5–7.7)
NEUTROPHILS NFR BLD AUTO: 45 %
OSMOLALITY SERPL CALC.SUM OF ELEC: 291 MOSM/KG (ref 275–295)
PLATELET # BLD AUTO: 315 10(3)UL (ref 150–450)
POTASSIUM SERPL-SCNC: 4.8 MMOL/L (ref 3.5–5.1)
RBC # BLD AUTO: 5.61 X10(6)UL
SODIUM SERPL-SCNC: 140 MMOL/L (ref 136–145)
TIBC SERPL-MCNC: 352 UG/DL (ref 240–450)
TRANSFERRIN SERPL-MCNC: 236 MG/DL (ref 200–360)
WBC # BLD AUTO: 9.1 X10(3) UL (ref 4–11)

## 2022-01-31 PROCEDURE — 86364 TISS TRNSGLTMNASE EA IG CLAS: CPT

## 2022-01-31 PROCEDURE — 82728 ASSAY OF FERRITIN: CPT

## 2022-01-31 PROCEDURE — 86256 FLUORESCENT ANTIBODY TITER: CPT

## 2022-01-31 PROCEDURE — 80053 COMPREHEN METABOLIC PANEL: CPT

## 2022-01-31 PROCEDURE — 82784 ASSAY IGA/IGD/IGG/IGM EACH: CPT

## 2022-01-31 PROCEDURE — 36415 COLL VENOUS BLD VENIPUNCTURE: CPT

## 2022-01-31 PROCEDURE — 83550 IRON BINDING TEST: CPT

## 2022-01-31 PROCEDURE — 85025 COMPLETE CBC W/AUTO DIFF WBC: CPT

## 2022-01-31 PROCEDURE — 83540 ASSAY OF IRON: CPT

## 2022-02-02 LAB — SMOOTH MUSCLE AB TITR SER: 20 {TITER}

## 2022-02-02 NOTE — PROGRESS NOTES
Amanda Drilling news, the liver enzymes are normal! These are improved from the labs we reviewed at your last office visit in 10/2020. The other tests were to look for alternative causes of liver disease, and all of these results returned normal.  Continue working towards maintaining a healthy weight and please schedule your screening colonoscopy (age 39 is the new cut off for starting age-related cancer screening). If you continue to have issues with swallowing, let Prateek Wells know and she can make sure we add an EGD to the appt for you colonoscopy. Please reach out if you need help with scheduling. Plan to repeat labs again in 6 months.     Please call with any questions,    Андрей Parish MD

## 2022-02-26 ENCOUNTER — HOSPITAL ENCOUNTER (OUTPATIENT)
Dept: CT IMAGING | Facility: HOSPITAL | Age: 47
Discharge: HOME | End: 2022-02-26
Attending: PHYSICIAN ASSISTANT
Payer: COMMERCIAL

## 2022-02-26 DIAGNOSIS — R07.89 LEFT-SIDED CHEST WALL PAIN: ICD-10-CM

## 2022-02-26 DIAGNOSIS — R06.02 SOB (SHORTNESS OF BREATH): ICD-10-CM

## 2022-02-26 DIAGNOSIS — Z86.16 HISTORY OF COVID-19: ICD-10-CM

## 2022-02-26 DIAGNOSIS — R06.2 WHEEZING: ICD-10-CM

## 2022-02-26 PROCEDURE — 71275 CT ANGIOGRAPHY CHEST: CPT | Performed by: PHYSICIAN ASSISTANT

## 2022-02-26 RX ORDER — IOHEXOL 350 MG/ML
100 INJECTION, SOLUTION INTRAVENOUS
Status: COMPLETED | OUTPATIENT
Start: 2022-02-26 | End: 2022-02-26

## 2022-02-26 RX ADMIN — IOHEXOL 100 ML: 350 INJECTION, SOLUTION INTRAVENOUS at 07:41:00

## 2022-03-10 ENCOUNTER — TELEPHONE (OUTPATIENT)
Dept: PODIATRY CLINIC | Facility: CLINIC | Age: 47
End: 2022-03-10

## 2022-03-10 ENCOUNTER — TELEPHONE (OUTPATIENT)
Dept: ADMINISTRATIVE | Age: 47
End: 2022-03-10

## 2022-03-10 NOTE — TELEPHONE ENCOUNTER
Patient called for status of Letter of Medical Necessity. Stated that he sent via [address] Advsd I did not see it. Pt called back to advise that he sent it to Dr. Landen Higginbotham as Dr. Erwin Box is out of the office.
show

## 2022-03-11 NOTE — TELEPHONE ENCOUNTER
FSA reimbursment form logged and [facility] sent for HIPAA- Unsure if doctor needs to complete.

## 2022-03-14 ENCOUNTER — TELEPHONE (OUTPATIENT)
Dept: PODIATRY CLINIC | Facility: CLINIC | Age: 47
End: 2022-03-14

## 2022-03-15 ENCOUNTER — HOSPITAL ENCOUNTER (OUTPATIENT)
Dept: CT IMAGING | Facility: HOSPITAL | Age: 47
Discharge: HOME | End: 2022-03-15
Attending: PHYSICIAN ASSISTANT
Payer: COMMERCIAL

## 2022-03-15 DIAGNOSIS — R09.81 SINUS CONGESTION: ICD-10-CM

## 2022-03-15 DIAGNOSIS — Z86.16 HISTORY OF COVID-19: ICD-10-CM

## 2022-03-15 PROCEDURE — 70486 CT MAXILLOFACIAL W/O DYE: CPT | Performed by: PHYSICIAN ASSISTANT

## 2022-04-08 ENCOUNTER — HOSPITAL ENCOUNTER (OUTPATIENT)
Dept: CV DIAGNOSTICS | Facility: HOSPITAL | Age: 47
End: 2022-04-08
Attending: HOSPITALIST
Payer: COMMERCIAL

## 2022-04-08 ENCOUNTER — HOSPITAL ENCOUNTER (OUTPATIENT)
Dept: CV DIAGNOSTICS | Facility: HOSPITAL | Age: 47
Discharge: HOME | End: 2022-04-08
Attending: HOSPITALIST
Payer: COMMERCIAL

## 2022-04-08 ENCOUNTER — LAB ENCOUNTER (OUTPATIENT)
Dept: LAB | Facility: HOSPITAL | Age: 47
End: 2022-04-08
Attending: HOSPITALIST
Payer: COMMERCIAL

## 2022-04-08 DIAGNOSIS — Z01.818 PRE-OPERATIVE EXAMINATION: ICD-10-CM

## 2022-04-08 DIAGNOSIS — B33.8 OTHER SPECIFIED VIRAL DISEASES: ICD-10-CM

## 2022-04-08 DIAGNOSIS — R07.9 CHEST PAIN, UNSPECIFIED TYPE: ICD-10-CM

## 2022-04-08 LAB — SARS-COV-2 RNA RESP QL NAA+PROBE: NOT DETECTED

## 2022-04-08 PROCEDURE — 93350 STRESS TTE ONLY: CPT | Performed by: HOSPITALIST

## 2022-04-08 PROCEDURE — 93017 CV STRESS TEST TRACING ONLY: CPT | Performed by: HOSPITALIST

## 2022-04-08 PROCEDURE — 93018 CV STRESS TEST I&R ONLY: CPT | Performed by: HOSPITALIST

## 2022-04-28 NOTE — IMAGING NOTE
Call placed to pt regarding CTA Gated coronary arteries study on 05/02/2022. Instructed to arrive at 9:30am. Instructed to drink plenty of fluids a day prior to procedure and day of procedure. May eat a light breakfast/lunch. Advised to hold caffeine 12 hrs prior to procedure.  Take prescribed metoprolol at 8:30pm 5/1/22 and 8:30am 5/2/2022

## 2022-05-02 ENCOUNTER — HOSPITAL ENCOUNTER (OUTPATIENT)
Dept: CT IMAGING | Facility: HOSPITAL | Age: 47
Discharge: HOME | End: 2022-05-02
Attending: INTERNAL MEDICINE
Payer: COMMERCIAL

## 2022-05-02 VITALS — TEMPERATURE: 98 F | SYSTOLIC BLOOD PRESSURE: 112 MMHG | HEART RATE: 56 BPM | DIASTOLIC BLOOD PRESSURE: 61 MMHG

## 2022-05-02 DIAGNOSIS — R06.02 SOB (SHORTNESS OF BREATH): ICD-10-CM

## 2022-05-02 DIAGNOSIS — Z86.16 HISTORY OF COVID-19: ICD-10-CM

## 2022-05-02 DIAGNOSIS — R07.9 CHEST PAIN, UNSPECIFIED: ICD-10-CM

## 2022-05-02 DIAGNOSIS — R53.83 FATIGUE: ICD-10-CM

## 2022-05-02 DIAGNOSIS — R06.02 BREATH SHORTNESS: ICD-10-CM

## 2022-05-02 DIAGNOSIS — M43.26 FUSION OF LUMBAR SPINE: ICD-10-CM

## 2022-05-02 DIAGNOSIS — R94.39 EQUIVOCAL STRESS TEST: ICD-10-CM

## 2022-05-02 DIAGNOSIS — R06.00 DOE (DYSPNEA ON EXERTION): ICD-10-CM

## 2022-05-02 LAB — CREAT BLD-MCNC: 0.9 MG/DL

## 2022-05-02 PROCEDURE — 82565 ASSAY OF CREATININE: CPT

## 2022-05-02 PROCEDURE — 75574 CT ANGIO HRT W/3D IMAGE: CPT | Performed by: INTERNAL MEDICINE

## 2022-05-02 RX ORDER — NITROGLYCERIN 0.4 MG/1
TABLET SUBLINGUAL
Status: COMPLETED
Start: 2022-05-02 | End: 2022-05-02

## 2022-05-02 RX ORDER — IOHEXOL 350 MG/ML
85 INJECTION, SOLUTION INTRAVENOUS
Status: COMPLETED | OUTPATIENT
Start: 2022-05-02 | End: 2022-05-02

## 2022-05-02 RX ADMIN — IOHEXOL 85 ML: 350 INJECTION, SOLUTION INTRAVENOUS at 10:40:00

## 2022-05-02 NOTE — IMAGING NOTE
Pt scheduled for CT gated coronary. Denies any contrast allergies. Denies taking Viagra, Cialis, Levitra or Imdur. Room # 4 used for scanning. O2 2L NC  HR 56 during scan at 1008   75 ml of 0.9 NS given. 85 ml of contrast given. Tolerated exam well.  Instructed to hydrate well to help clear contrast.

## 2022-05-18 ENCOUNTER — LAB ENCOUNTER (OUTPATIENT)
Dept: LAB | Age: 47
End: 2022-05-18
Attending: INTERNAL MEDICINE
Payer: COMMERCIAL

## 2022-05-18 DIAGNOSIS — R06.00 DOE (DYSPNEA ON EXERTION): ICD-10-CM

## 2022-05-18 DIAGNOSIS — R53.83 FATIGUE: ICD-10-CM

## 2022-05-18 DIAGNOSIS — R94.39 EQUIVOCAL STRESS TEST: ICD-10-CM

## 2022-05-18 DIAGNOSIS — R07.9 CHEST PAIN: ICD-10-CM

## 2022-05-18 DIAGNOSIS — R06.00 DYSPNEA: Primary | ICD-10-CM

## 2022-05-18 LAB
CHOLEST SERPL-MCNC: 170 MG/DL (ref ?–200)
FASTING PATIENT LIPID ANSWER: YES
HDLC SERPL-MCNC: 41 MG/DL (ref 40–59)
LDLC SERPL CALC-MCNC: 95 MG/DL (ref ?–100)
NONHDLC SERPL-MCNC: 129 MG/DL (ref ?–130)
TRIGL SERPL-MCNC: 200 MG/DL (ref 30–149)
TSI SER-ACNC: 1.11 MIU/ML (ref 0.36–3.74)
VIT D+METAB SERPL-MCNC: 30.5 NG/ML (ref 30–100)
VLDLC SERPL CALC-MCNC: 33 MG/DL (ref 0–30)

## 2022-05-18 PROCEDURE — 84443 ASSAY THYROID STIM HORMONE: CPT

## 2022-05-18 PROCEDURE — 36415 COLL VENOUS BLD VENIPUNCTURE: CPT

## 2022-05-18 PROCEDURE — 80061 LIPID PANEL: CPT

## 2022-05-18 PROCEDURE — 82306 VITAMIN D 25 HYDROXY: CPT

## 2022-05-24 ENCOUNTER — LAB ENCOUNTER (OUTPATIENT)
Dept: LAB | Facility: HOSPITAL | Age: 47
End: 2022-05-24
Attending: OTOLARYNGOLOGY
Payer: COMMERCIAL

## 2022-05-24 DIAGNOSIS — J32.0 CHRONIC MAXILLARY SINUSITIS: ICD-10-CM

## 2022-05-24 DIAGNOSIS — J32.1 CHRONIC FRONTAL SINUSITIS: ICD-10-CM

## 2022-05-24 LAB — SARS-COV-2 RNA RESP QL NAA+PROBE: NOT DETECTED

## 2022-06-24 ENCOUNTER — HOSPITAL ENCOUNTER (OUTPATIENT)
Dept: NUCLEAR MEDICINE | Facility: HOSPITAL | Age: 47
Discharge: HOME | End: 2022-06-24
Attending: FAMILY MEDICINE
Payer: COMMERCIAL

## 2022-06-24 DIAGNOSIS — R07.89 CHEST WALL PAIN: ICD-10-CM

## 2022-06-24 PROCEDURE — 78832 RP LOCLZJ TUM SPECT W/CT 2: CPT | Performed by: FAMILY MEDICINE

## 2022-06-24 PROCEDURE — 78306 BONE IMAGING WHOLE BODY: CPT | Performed by: FAMILY MEDICINE

## 2022-07-12 ENCOUNTER — OFFICE VISIT (OUTPATIENT)
Dept: SLEEP CENTER | Age: 47
End: 2022-07-12
Attending: HOSPITALIST
Payer: COMMERCIAL

## 2022-07-12 DIAGNOSIS — G47.33 OSA (OBSTRUCTIVE SLEEP APNEA): ICD-10-CM

## 2022-07-12 PROCEDURE — 95806 SLEEP STUDY UNATT&RESP EFFT: CPT

## 2022-09-08 ENCOUNTER — OFFICE VISIT (OUTPATIENT)
Dept: SLEEP CENTER | Age: 47
End: 2022-09-08
Attending: Other
Payer: COMMERCIAL

## 2022-09-08 DIAGNOSIS — G47.33 OSA (OBSTRUCTIVE SLEEP APNEA): ICD-10-CM

## 2022-09-08 DIAGNOSIS — R06.00 DYSPNEA: ICD-10-CM

## 2022-09-08 DIAGNOSIS — I50.9 CONGESTIVE HEART FAILURE (CHF) (HCC): ICD-10-CM

## 2022-09-08 PROCEDURE — 95810 POLYSOM 6/> YRS 4/> PARAM: CPT

## 2023-06-19 ENCOUNTER — OFFICE VISIT (OUTPATIENT)
Dept: PODIATRY CLINIC | Facility: CLINIC | Age: 48
End: 2023-06-19

## 2023-06-19 DIAGNOSIS — M25.579 ANKLE PAIN, UNSPECIFIED CHRONICITY, UNSPECIFIED LATERALITY: Primary | ICD-10-CM

## 2023-06-19 DIAGNOSIS — S93.401A MILD ANKLE SPRAIN, RIGHT, INITIAL ENCOUNTER: ICD-10-CM

## 2023-06-19 DIAGNOSIS — S93.491A SPRAIN OF ANTERIOR TALOFIBULAR LIGAMENT OF RIGHT ANKLE, INITIAL ENCOUNTER: ICD-10-CM

## 2023-06-23 NOTE — TELEPHONE ENCOUNTER
Patient contacted Dr. Ralph Kunz office in regards to obtaining an a note for work. Dr. Ralph Kunz office told patient that he would have to obtain note from Dr. Reese Meza since he issued physical therapy for patient. Spoke with Dr. Gray (PCP) about magnitude of abscess, possible fistula, plan for po Augmentin/Bactrim, need for close IR follow up, potential future bowel resection.  He is willing to see patient in his office on 6/26/23 at 10:30 AM and coordinate necessary referrals through the [employer] system.  He requests antibiotic scripts be provided at d/c.  He confirms that he is able to view records through [facility]. Discussed with IR team here.    MD Heidi

## 2024-08-20 PROBLEM — D12.4 BENIGN NEOPLASM OF DESCENDING COLON: Status: ACTIVE | Noted: 2024-08-20

## 2024-08-20 PROBLEM — R13.10 DYSPHAGIA, UNSPECIFIED: Status: ACTIVE | Noted: 2024-08-20

## 2024-08-20 PROBLEM — R13.10 ODYNOPHAGIA: Status: ACTIVE | Noted: 2024-08-20

## 2024-08-20 PROBLEM — Z12.11 SPECIAL SCREENING FOR MALIGNANT NEOPLASM OF COLON: Status: ACTIVE | Noted: 2024-08-20

## 2024-08-20 PROBLEM — K21.9 GASTROESOPHAGEAL REFLUX DISEASE: Status: ACTIVE | Noted: 2024-08-20

## 2024-08-20 PROBLEM — D12.5 BENIGN NEOPLASM OF SIGMOID COLON: Status: ACTIVE | Noted: 2024-08-20

## 2024-10-16 ENCOUNTER — HOSPITAL ENCOUNTER (EMERGENCY)
Facility: HOSPITAL | Age: 49
Discharge: HOME | End: 2024-10-16
Attending: EMERGENCY MEDICINE
Payer: COMMERCIAL

## 2024-10-16 ENCOUNTER — APPOINTMENT (OUTPATIENT)
Dept: GENERAL RADIOLOGY | Facility: HOSPITAL | Age: 49
End: 2024-10-16
Attending: EMERGENCY MEDICINE
Payer: COMMERCIAL

## 2024-10-16 ENCOUNTER — TELEPHONE (OUTPATIENT)
Facility: LOCATION | Age: 49
End: 2024-10-16

## 2024-10-16 ENCOUNTER — APPOINTMENT (OUTPATIENT)
Dept: CT IMAGING | Facility: HOSPITAL | Age: 49
End: 2024-10-16
Attending: EMERGENCY MEDICINE
Payer: COMMERCIAL

## 2024-10-16 VITALS
BODY MASS INDEX: 34.1 KG/M2 | OXYGEN SATURATION: 100 % | TEMPERATURE: 98 F | HEART RATE: 63 BPM | SYSTOLIC BLOOD PRESSURE: 111 MMHG | RESPIRATION RATE: 16 BRPM | HEIGHT: 68 IN | DIASTOLIC BLOOD PRESSURE: 73 MMHG | WEIGHT: 225 LBS

## 2024-10-16 DIAGNOSIS — R07.81 RIB PAIN ON LEFT SIDE: Primary | ICD-10-CM

## 2024-10-16 LAB
ALBUMIN SERPL-MCNC: 4.6 G/DL (ref 3.2–4.8)
ALBUMIN/GLOB SERPL: 1.6 {RATIO} (ref 1–2)
ALP LIVER SERPL-CCNC: 78 U/L
ALT SERPL-CCNC: 74 U/L
ANION GAP SERPL CALC-SCNC: 6 MMOL/L (ref 0–18)
AST SERPL-CCNC: 43 U/L (ref ?–34)
ATRIAL RATE: 81 BPM
BASOPHILS # BLD AUTO: 0.08 X10(3) UL (ref 0–0.2)
BASOPHILS NFR BLD AUTO: 0.9 %
BILIRUB SERPL-MCNC: 1.4 MG/DL (ref 0.3–1.2)
BUN BLD-MCNC: 13 MG/DL (ref 9–23)
CALCIUM BLD-MCNC: 9.7 MG/DL (ref 8.7–10.4)
CHLORIDE SERPL-SCNC: 107 MMOL/L (ref 98–112)
CO2 SERPL-SCNC: 27 MMOL/L (ref 21–32)
CREAT BLD-MCNC: 1 MG/DL
D DIMER PPP FEU-MCNC: 0.46 UG/ML FEU (ref ?–0.5)
EGFRCR SERPLBLD CKD-EPI 2021: 93 ML/MIN/1.73M2 (ref 60–?)
EOSINOPHIL # BLD AUTO: 0.3 X10(3) UL (ref 0–0.7)
EOSINOPHIL NFR BLD AUTO: 3.3 %
ERYTHROCYTE [DISTWIDTH] IN BLOOD BY AUTOMATED COUNT: 12.7 %
GLOBULIN PLAS-MCNC: 2.9 G/DL (ref 2–3.5)
GLUCOSE BLD-MCNC: 102 MG/DL (ref 70–99)
HCT VFR BLD AUTO: 45.3 %
HGB BLD-MCNC: 16.1 G/DL
IMM GRANULOCYTES # BLD AUTO: 0.04 X10(3) UL (ref 0–1)
IMM GRANULOCYTES NFR BLD: 0.4 %
LYMPHOCYTES # BLD AUTO: 2.94 X10(3) UL (ref 1–4)
LYMPHOCYTES NFR BLD AUTO: 32.5 %
MCH RBC QN AUTO: 31.3 PG (ref 26–34)
MCHC RBC AUTO-ENTMCNC: 35.5 G/DL (ref 31–37)
MCV RBC AUTO: 88.1 FL
MONOCYTES # BLD AUTO: 0.96 X10(3) UL (ref 0.1–1)
MONOCYTES NFR BLD AUTO: 10.6 %
NEUTROPHILS # BLD AUTO: 4.74 X10 (3) UL (ref 1.5–7.7)
NEUTROPHILS # BLD AUTO: 4.74 X10(3) UL (ref 1.5–7.7)
NEUTROPHILS NFR BLD AUTO: 52.3 %
OSMOLALITY SERPL CALC.SUM OF ELEC: 290 MOSM/KG (ref 275–295)
P AXIS: 38 DEGREES
P-R INTERVAL: 166 MS
PLATELET # BLD AUTO: 293 10(3)UL (ref 150–450)
POTASSIUM SERPL-SCNC: 4 MMOL/L (ref 3.5–5.1)
PROT SERPL-MCNC: 7.5 G/DL (ref 5.7–8.2)
Q-T INTERVAL: 376 MS
QRS DURATION: 92 MS
QTC CALCULATION (BEZET): 436 MS
R AXIS: 9 DEGREES
RBC # BLD AUTO: 5.14 X10(6)UL
SODIUM SERPL-SCNC: 140 MMOL/L (ref 136–145)
T AXIS: 30 DEGREES
TROPONIN I SERPL HS-MCNC: <3 NG/L
TROPONIN I SERPL HS-MCNC: <3 NG/L
VENTRICULAR RATE: 81 BPM
WBC # BLD AUTO: 9.1 X10(3) UL (ref 4–11)

## 2024-10-16 PROCEDURE — 80053 COMPREHEN METABOLIC PANEL: CPT | Performed by: EMERGENCY MEDICINE

## 2024-10-16 PROCEDURE — 71275 CT ANGIOGRAPHY CHEST: CPT | Performed by: EMERGENCY MEDICINE

## 2024-10-16 PROCEDURE — 93005 ELECTROCARDIOGRAM TRACING: CPT

## 2024-10-16 PROCEDURE — 85025 COMPLETE CBC W/AUTO DIFF WBC: CPT | Performed by: EMERGENCY MEDICINE

## 2024-10-16 PROCEDURE — 84484 ASSAY OF TROPONIN QUANT: CPT | Performed by: EMERGENCY MEDICINE

## 2024-10-16 PROCEDURE — 36415 COLL VENOUS BLD VENIPUNCTURE: CPT

## 2024-10-16 PROCEDURE — 71045 X-RAY EXAM CHEST 1 VIEW: CPT | Performed by: EMERGENCY MEDICINE

## 2024-10-16 PROCEDURE — 74177 CT ABD & PELVIS W/CONTRAST: CPT | Performed by: EMERGENCY MEDICINE

## 2024-10-16 PROCEDURE — 85379 FIBRIN DEGRADATION QUANT: CPT | Performed by: EMERGENCY MEDICINE

## 2024-10-16 PROCEDURE — 99285 EMERGENCY DEPT VISIT HI MDM: CPT

## 2024-10-16 RX ORDER — LIDOCAINE 50 MG/G
1 PATCH TOPICAL EVERY 24 HOURS
Qty: 10 PATCH | Refills: 0 | Status: SHIPPED | OUTPATIENT
Start: 2024-10-16

## 2024-10-16 NOTE — ED PROVIDER NOTES
Patient Seen in: [facility] Emergency Department      History     Chief Complaint   Patient presents with    Chest Pain Angina     Stated Complaint: cp    Subjective:   HPI    49-year-old male history of anxiety, GERD presents to ED with chest/rib discomfort in left lateral trunk/chest,  that occurred while he was sitting down at a general surgery doctor appointment to check on his right inguinal hernia.  He reports mild left shoulder pain also.  He received aspirin, nitroglycerin 100 micrograms of fentanyl from EMS.  He denies injury, trauma.  He reports that he carries a bag on his left side daily.  He denies abdominal pain.  Denies cardiac history.      Objective:     Past Medical History:    Abdominal pain    On and off not consistent    Allergic rhinitis    Anxiety    Comes and goes being treated for it.    Arthritis    Back pain    Surgery in 2006    Black stools    Only time    Bloating    Nothing consistent    Blood in the stool    Only time    Blurred vision    legally blind in right eye    Chronic cough    for 3 weeks    Decorative tattoo    Left upper arm    Depression    Diarrhea, unspecified    Once every few months    Esophageal reflux    Heartburn    On and off not consistent    Hemorrhoids    Havent had in while    History of depression    Last few years    History of mental disorder    Aniexty, Panic attacks    Indigestion    On and off not consistent    Pain with bowel movements    Once every few months brings on cold sweat    Painful swallowing    When I eat too fast it occurs    Shortness of breath    for 3 weeks    Sleep disturbance    Stress    This year hasnt helped    Wears glasses    Glasses    Wheezing    for 3 weeks              Past Surgical History:   Procedure Laterality Date    Back surgery  02/2006    L5, S1    Colonoscopy  12/2007    Other surgical history  Feb 2020    Left ulnar nerve surgery    Skin surgery  10/02/2017    Excision of a Cyst - L mid back    Spine surgery  procedure unlisted  02/2006    L5 S1 fusion and disc replacement                Social History     Socioeconomic History    Marital status:    Tobacco Use    Smoking status: Never    Smokeless tobacco: Never    Tobacco comments:     Handful on cigars over the years   Vaping Use    Vaping status: Never Used   Substance and Sexual Activity    Alcohol use: Not Currently     Comment: Not with the medication I'm on.    Drug use: Never                  Physical Exam     ED Triage Vitals   BP 10/16/24 1107 112/61   Pulse 10/16/24 1107 87   Resp 10/16/24 1107 18   Temp 10/16/24 1114 97.9 °F (36.6 °C)   Temp src 10/16/24 1114 Temporal   SpO2 10/16/24 1107 92 %   O2 Device 10/16/24 1105 None (Room air)             Physical Exam  Vital signs reviewed.  Nursing note reviewed.  Constitutional: Alert, well-appearing  Head: Normocephalic, atraumatic  Mouth: Moist  Eyes: Extraocular muscles intact, pupils equal  Cardiovascular: Regular rate and rhythm.  Pinpoint tenderness on left lateral ribs in the posterior axillary line around rib 7.  No rash.  Pulmonary: Effort normal, breath sounds normal  Abdomen: Soft, nontender nondistended  Skin: Warm and dry  Musculoskeletal range of motion grossly normal all extremities  Neuro: Alert, at baseline, no focal neuro deficit.  Moves all extremities against gravity  Psych: Mood normal          ED Course     Labs Reviewed   COMP METABOLIC PANEL (14) - Abnormal; Notable for the following components:       Result Value    Glucose 102 (*)     AST 43 (*)     ALT 74 (*)     Bilirubin, Total 1.4 (*)     All other components within normal limits   TROPONIN I HIGH SENSITIVITY - Normal   D-DIMER - Normal   TROPONIN I HIGH SENSITIVITY - Normal   CBC WITH DIFFERENTIAL WITH PLATELET   RAINBOW DRAW LAVENDER   RAINBOW DRAW LIGHT GREEN   RAINBOW DRAW BLUE     EKG    Rate, intervals and axes as noted on EKG Report.  Rate: 81  Rhythm: Sinus Rhythm  Reading: no new st tw abn                CTA CHEST + CT  ABD (W) + CT PEL (W) (CPT=71275/71044)    Result Date: 10/16/2024  PROCEDURE:  CTA CHEST + CT ABD (W) + CT PEL (W) (CPT=71275/69108)  COMPARISON:  [facility] , CT, CT ANGIOGRAPHY, CHEST (CPT=71275), 2/26/2022, 7:27 AM.  [facility] , CT, CTA GATED CORONARY ARTERIES W CALCIUM SCORING (CPT=75574), 5/02/2022, 9:59 AM.  INDICATIONS:  mid thoraco-lumbar left sided back pain radiating to left shoulder, r/o PE, r/o kidney stone, r/o spleen rupture  TECHNIQUE:  CT of the chest (with CTA imaging), abdomen, and pelvis were obtained post- injection of non-ionic intravenous contrast material. Multi-planar reformatted/3-D images were created to optimize visualization of vascular anatomy.  Dose reduction techniques were used. Dose information is transmitted to the ACR (American College of Radiology) NRDR (National Radiology Data Registry) which includes the Dose Index Registry.  PATIENT STATED HISTORY:(As transcribed by Technologist)  Patient is here for chest pain that started today while sitting down, pt reports pain is left sided radiating to L arm and into mid-lower back.   CONTRAST USED:  100cc of Isovue 370  FINDINGS:   CHEST:  LUNGS:  No visible pulmonary disease.  MEDIASTINUM:  No mass or adenopathy.  ABEL:  No mass or adenopathy.  CARDIAC:  No enlargement, pericardial thickening, or significant coronary artery calcification. PLEURA:  No mass or effusion.  CHEST WALL:  No mass or axillary adenopathy.  AORTA:  No aneurysm or dissection.  VASCULATURE:  No visible pulmonary arterial thrombus or attenuation.   ABDOMEN/PELVIS: LIVER:  No enlargement, atrophy, abnormal density, or significant focal lesion.  BILIARY:  No visible dilatation or calcification.  PANCREAS:  No lesion, fluid collection, ductal dilatation, or atrophy.  SPLEEN:  No enlargement or focal lesion.  KIDNEYS:  No mass, obstruction, or calcification.  ADRENALS:  No mass or enlargement.  AORTA:  No aneurysm or dissection.  RETROPERITONEUM:  No mass or adenopathy.   BOWEL/MESENTERY:  No visible mass, obstruction, or bowel wall thickening.  Mild diverticulosis without evidence of acute diverticulitis. ABDOMINAL WALL:  Small fat containing bilateral inguinal hernia URINARY BLADDER:  No visible focal wall thickening, lesion, or calculus.  PELVIC NODES:  No adenopathy.  PELVIC ORGANS:  No visible mass.  Pelvic organs appropriate for patient age.  BONES:  Hardware at L5-S1 on the left is noted.            CONCLUSION:   1. No evidence of pulmonary embolus.  2. No evidence of an acute inflammatory process in the abdomen and pelvis.   LOCATION:  [facility]   Dictated by (CST): Olvin Kc MD on 10/16/2024 at 12:30 PM     Finalized by (CST): Olvin Kc MD on 10/16/2024 at 12:34 PM       XR CHEST AP PORTABLE  (CPT=71045)    Result Date: 10/16/2024  PROCEDURE:  XR CHEST AP PORTABLE  (CPT=71045)  TECHNIQUE:  AP chest radiograph was obtained.  COMPARISON:  [facility], XR, XR CHEST PA + LAT CHEST (WZS=[id number]), 10/19/2021, 8:01 AM.  [facility], XR, CHEST PA   LATERAL, 8/13/2008, 8:10 PM.  INDICATIONS:  cp  PATIENT STATED HISTORY: (As transcribed by Technologist)  Patient offered no additional history at this time    FINDINGS:  No focal consolidation.  No pleural effusion.  No pneumothorax.  No pulmonary edema.  The cardiomediastinal silhouette is within normal limits.  No acute osseous findings.            CONCLUSION:  No acute radiographic findings.   LOCATION:  [facility]      Dictated by (CST): Jadon Gomes MD on 10/16/2024 at 11:40 AM     Finalized by (CST): Jadon Gomes MD on 10/16/2024 at 11:41 AM             [facility]      Medical Decision Making:    Differential diagnosis before testing includes rib strain, ACS, PE, spleen rupture given referral of pain to shoulder, potential life threatening diagnosis which is a medical condition that poses a threat to life/function.     Comorbidities that add complexity to management:anxiety    I reviewed prior external ED notes including  reviewed surgery note where he was at earlier today when he developed the ekta    I personally reviewed the radiographs and my independent interpretation includes no lung consolidations, no pneumothorax.     Shared decision making:    Troponin negative x 2, D-dimer negative, CTA chest abdomen negative.  Patient pain-free while in ED.  Offered lidocaine patch, Toradol, however patient declined.  He states he has Tylenol and Motrin at home.  I will prescribe him lidocaine patches.  Given the pinpoint nature of the pain on his left lateral ribs, possible musculoskeletal etiology for pain.  Told to follow-up with primary care doctor and cardiology, contact numbers given below to call for appointment tomorrow.    Medical Decision Making      Disposition and Plan     Clinical Impression:  1. Rib pain on left side         Disposition:  Discharge  10/16/2024  2:53 pm    Follow-up:  SASHA DUONG  [facility] [address] [facility] [address] [phone]  Follow up      SASHA TODD  [address]  [facility] [id number] [phone]              Medications Prescribed:  Discharge Medication List as of 10/16/2024  2:53 PM        START taking these medications    Details   lidocaine 5 % External Patch Place 1 patch onto the skin daily., Normal, Disp-10 patch, R-0                 Supplementary Documentation:

## 2024-10-16 NOTE — TELEPHONE ENCOUNTER
Patient arrived for scheduled OV today.  C/o chest pain radiating to shoulder/arm.  911 called and responded.    Patient taken by ambulance to hospital for evaluation/treatment.    This RN to f/u with patient for condition update and to r/s today's appt.

## 2024-10-16 NOTE — TELEPHONE ENCOUNTER
Patient was d/c'ed from ED, and walked into clinic to r/s appt.  Future Appointments   Date Time Provider Department Center   10/22/2024  1:45 PM Jarad Carranza MD [id number]

## 2024-10-22 ENCOUNTER — OFFICE VISIT (OUTPATIENT)
Facility: LOCATION | Age: 49
End: 2024-10-22
Payer: COMMERCIAL

## 2024-10-22 VITALS
HEART RATE: 89 BPM | SYSTOLIC BLOOD PRESSURE: 125 MMHG | OXYGEN SATURATION: 97 % | DIASTOLIC BLOOD PRESSURE: 78 MMHG | RESPIRATION RATE: 20 BRPM | TEMPERATURE: 98 F

## 2024-10-22 DIAGNOSIS — K40.20 BILATERAL INGUINAL HERNIA WITHOUT OBSTRUCTION OR GANGRENE, RECURRENCE NOT SPECIFIED: Primary | ICD-10-CM

## 2024-10-22 PROCEDURE — 3074F SYST BP LT 130 MM HG: CPT | Performed by: STUDENT IN AN ORGANIZED HEALTH CARE EDUCATION/TRAINING PROGRAM

## 2024-10-22 PROCEDURE — 3078F DIAST BP <80 MM HG: CPT | Performed by: STUDENT IN AN ORGANIZED HEALTH CARE EDUCATION/TRAINING PROGRAM

## 2024-10-22 PROCEDURE — 99203 OFFICE O/P NEW LOW 30 MIN: CPT | Performed by: STUDENT IN AN ORGANIZED HEALTH CARE EDUCATION/TRAINING PROGRAM

## 2024-10-30 ENCOUNTER — TELEPHONE (OUTPATIENT)
Facility: LOCATION | Age: 49
End: 2024-10-30

## 2024-10-30 NOTE — TELEPHONE ENCOUNTER
Spoke with patient. He says he is having a hard time working with pain. Is taking ibuprofen and tylenol as well as a muscle relaxer. Patient is requesting a note excusing work 10/29-11/1. Previous note given to patient is to work with light duty, not lifting more than 20lbs.     Spoke with PA's Mylene Tilley PA, and Shayy Emmanuel PA, We cannot provide excuse from work letters preoperatively. [facility] message sent to patient.

## 2024-10-30 NOTE — H&P
New Patient Visit Note       Active Problems      1. Bilateral inguinal hernia without obstruction or gangrene, recurrence not specified        Chief Complaint   Chief Complaint   Patient presents with    New Patient     NP - Bilateral inguinal hernia, MRI done at [facility]- states pain in right groin        History of Present Illness   49 year old male who is here for evaluation of bilateral inguinal hernias. He was here previously however developed concerning chest pain. This was evaluated with complete lung and cardiac workup and was negative. He reports severe right groin pain. He works for the [employer] and his job requires significant amount of heavy lifting and movement. He reports pain is aggravated with lifting and walking. He reports some discomfort in the left groin too however this is less and mild compared to the right side. He denies nausea or vomiting or any other symptoms.       Allergies  Isaac is allergic to vancomycin.    Past Medical / Surgical / Social / Family History    The past medical and past surgical history have been reviewed by me today.    Past Medical History:    Abdominal pain    On and off not consistent    Allergic rhinitis    Anxiety    Comes and goes being treated for it.    Arthritis    Back pain    Surgery in 2006    Black stools    Only time    Bloating    Nothing consistent    Blood in the stool    Only time    Blurred vision    legally blind in right eye    Chronic cough    for 3 weeks    Decorative tattoo    Left upper arm    Depression    Diarrhea, unspecified    Once every few months    Esophageal reflux    Heartburn    On and off not consistent    Hemorrhoids    Havent had in while    History of depression    Last few years    History of mental disorder    Aniexty, Panic attacks    Indigestion    On and off not consistent    Pain with bowel movements    Once every few months brings on cold sweat    Painful swallowing    When I eat too fast it occurs     Shortness of breath    for 3 weeks    Sleep disturbance    Stress    This year hasnt helped    Wears glasses    Glasses    Wheezing    for 3 weeks     Past Surgical History:   Procedure Laterality Date    Back surgery  2006    L5, S1    Colonoscopy  2007    Other surgical history  2020    Left ulnar nerve surgery    Skin surgery  10/02/2017    Excision of a Cyst - L mid back    Spine surgery procedure unlisted  2006    L5 S1 fusion and disc replacement       The family history and social history have been reviewed by me today.    Family History   Problem Relation Age of Onset    Prostate Cancer Father             Mental Disorder Father     Ovarian Cancer Maternal Grandmother          in     Heart Attack Maternal Grandfather     Heart Attack Paternal Grandmother     Mental Disorder Paternal Grandmother     Heart Attack Paternal Grandfather     Mental Disorder Brother      Social History     Socioeconomic History    Marital status:    Tobacco Use    Smoking status: Never    Smokeless tobacco: Never    Tobacco comments:     Handful on cigars over the years   Vaping Use    Vaping status: Never Used   Substance and Sexual Activity    Alcohol use: Not Currently     Comment: Not with the medication I'm on.    Drug use: Never        Current Outpatient Medications:     baclofen 5 MG Oral Tab, , Disp: , Rfl:     acetaminophen-codeine 300-30 MG Oral Tab, TAKE 1 TABLET BY MOUTH EVERY 6 HOURS FOR 5 DAYS AS NEEDED, Disp: , Rfl:     lidocaine 5 % External Patch, Place 1 patch onto the skin daily., Disp: 10 patch, Rfl: 0    Omeprazole 40 MG Oral Capsule Delayed Release, Take 1 capsule (40 mg total) by mouth 2 (two) times daily before meals. 30 min prior to meals, Disp: 180 capsule, Rfl: 3    FLUoxetine 20 MG Oral Cap, TAKE ONE CAPSULE BY MOUTH ONCE EVERY MORNING WITH 40MG, Disp: , Rfl:     ascorbic acid, VITAMIN C, 250 MG Oral Tab, Take 1 tablet (250 mg total) by mouth daily., Disp: , Rfl:      clonazePAM 1 MG Oral Tab, , Disp: , Rfl:     traZODone HCl 150 MG Oral Tab, Take 1 tablet (150 mg total) by mouth nightly., Disp: , Rfl:     Zinc 50 MG Oral Cap, , Disp: , Rfl:     FLUoxetine HCl 40 MG Oral Cap, Take 1 capsule (40 mg total) by mouth daily., Disp: , Rfl:     LORazepam 0.5 MG Oral Tab, as needed.  , Disp: , Rfl:     Probiotic Product (PROBIOTIC-10 OR), Take by mouth., Disp: , Rfl:     Multiple Vitamins-Minerals (MULTIVITAMIN ADULT EXTRA C OR), Take by mouth., Disp: , Rfl:       Review of Systems  The Review of Systems has been reviewed by me during today.  Review of Systems   Constitutional:  Negative for chills, diaphoresis, fatigue and fever.   HENT:  Negative for ear discharge, ear pain and sore throat.    Eyes:  Negative for pain and discharge.   Respiratory:  Negative for cough, chest tightness and shortness of breath.    Cardiovascular:  Negative for chest pain, palpitations and leg swelling.   Gastrointestinal:  Negative for abdominal distention, abdominal pain, blood in stool, constipation, diarrhea, nausea and vomiting.   Genitourinary:  Negative for dysuria, frequency, hematuria and urgency.   Skin:  Negative for color change, pallor and rash.   Neurological:  Negative for weakness, light-headedness, numbness and headaches.   Hematological:  Negative for adenopathy. Does not bruise/bleed easily.   Psychiatric/Behavioral:  Negative for agitation and confusion.        Physical Findings   /78   Pulse 89   Temp 98.3 °F (36.8 °C) (Temporal)   Resp 20   SpO2 97%   Physical Exam  Abdominal:      General: Abdomen is flat. There is no distension.      Palpations: Abdomen is soft.      Comments: Bilateral tenderness in right and left groin, small inguinal  hernias bilaterally and easily reducible             Assessment/Plan  1. Bilateral inguinal hernia without obstruction or gangrene, recurrence not specified        Isaac Perez is a 49 year old male referred by Reza Yost MD for  evaluation of inguinal hernias. The right side is more symptomatic than the left. I reviewed his MRI and his CT scan of the abdomen and pelvis and noted the bilateral inguinal hernias containing fat. I discussed with the patient the treatment options. He would like to undergo surgical repair. I recommend proceeding with robotic right and possible left inguinal hernia repair. The risks, benefits, and alternatives of surgical intervention were explained to the patient in detail including, but not limited to, bleeding, infection, recurrence, nondiagnostic yield, incorrect diagnosis, prolonged postoperative pain, urinary retention, injury to adjacent organs and structures, injury to the ilioinguinal and iliohypogastric nerve, testicular ischemia leading to ischemic orchitis or testicular atrophy, injury to the vas deferens, as well as potential need for further therapeutic, diagnostic or surgical intervention. The patient voiced understanding. All  pertinent questions were answered to the patient's satisfaction, after whichwilling and informed consent to proceed with surgery was obtained.  .           Jarad Carranza MD

## 2024-10-30 NOTE — TELEPHONE ENCOUNTER
Patient states that he is having trouble dealing with pain at work. He states that he's usually able to manage the pain but for the last couple of days he hasn't been able to. He is asking for a letter for work. He says that he has taken off the rest of the week due to pain. Patient is a      Please advise   # [phone]

## 2024-11-05 ENCOUNTER — TELEPHONE (OUTPATIENT)
Dept: ORTHOPEDICS CLINIC | Facility: CLINIC | Age: 49
End: 2024-11-05

## 2024-11-05 DIAGNOSIS — M25.551 PAIN OF RIGHT HIP: Primary | ICD-10-CM

## 2024-11-05 NOTE — TELEPHONE ENCOUNTER
XR ordered and scheduled per Ortho protocol.   Sent patient message via [facility] to inform them and ask them to arrive 15-20 minutes prior to appointment with Dr. chaney

## 2024-11-11 ENCOUNTER — OFFICE VISIT (OUTPATIENT)
Facility: CLINIC | Age: 49
End: 2024-11-11
Payer: COMMERCIAL

## 2024-11-11 ENCOUNTER — HOSPITAL ENCOUNTER (OUTPATIENT)
Dept: GENERAL RADIOLOGY | Age: 49
Discharge: HOME | End: 2024-11-11
Attending: STUDENT IN AN ORGANIZED HEALTH CARE EDUCATION/TRAINING PROGRAM
Payer: COMMERCIAL

## 2024-11-11 VITALS — BODY MASS INDEX: 34.86 KG/M2 | WEIGHT: 230 LBS | HEIGHT: 68 IN

## 2024-11-11 DIAGNOSIS — M25.551 PAIN OF RIGHT HIP: ICD-10-CM

## 2024-11-11 DIAGNOSIS — M54.50 LOW BACK PAIN RADIATING TO LOWER EXTREMITY: ICD-10-CM

## 2024-11-11 DIAGNOSIS — M79.606 LOW BACK PAIN RADIATING TO LOWER EXTREMITY: ICD-10-CM

## 2024-11-11 DIAGNOSIS — M25.551 RIGHT HIP PAIN: Primary | ICD-10-CM

## 2024-11-11 PROCEDURE — 73502 X-RAY EXAM HIP UNI 2-3 VIEWS: CPT | Performed by: STUDENT IN AN ORGANIZED HEALTH CARE EDUCATION/TRAINING PROGRAM

## 2024-11-11 PROCEDURE — 3008F BODY MASS INDEX DOCD: CPT | Performed by: STUDENT IN AN ORGANIZED HEALTH CARE EDUCATION/TRAINING PROGRAM

## 2024-11-11 PROCEDURE — 99203 OFFICE O/P NEW LOW 30 MIN: CPT | Performed by: STUDENT IN AN ORGANIZED HEALTH CARE EDUCATION/TRAINING PROGRAM

## 2024-11-11 RX ORDER — CLONAZEPAM 0.5 MG/1
0.5 TABLET ORAL 2 TIMES DAILY
COMMUNITY
Start: 2024-11-04

## 2024-11-11 NOTE — PROGRESS NOTES
Orthopaedic Surgery  [address] [address] [address]   [phone]       Chief Complaint:  Right Hip Pain    History of Present Illness:   Isaac Perez is a 49 year old male seen in clinic today as new for evaluation of their right hip. Symptoms have been present for the last 3 months. It began after he went on a hike. He reports that on the way down, it was uneven and he felt a pop. Patient reports weakness and numbness of the lower extremity that took some time to improve. Since that time, he has been evaluated by orthopedic spine at [facility]. He was treated with injections which he reports seem to have helped. However he still has some persistent pain and he was referred to see for evaluation of his hip. Pain is located at the posterolateral hip and thigh, as well as groin to the medial thigh. Their joint pain interferes with their activities of daily life and work as a  with walking, going up or down stairs, bending. Their condition is stable. No prior hip pain or surgeries to the hip. He has bilateral inguinal hernias as well and is scheduled to undergo surgery in January 2025 with general surgery.    Prior imaging studies have included none. No treatment thus far for the hip.     Assistive devices used: none      PMH/PSH/Family History/Social History/Meds/Allergies:   Past Medical History:    Abdominal pain    On and off not consistent    Allergic rhinitis    Anxiety    Comes and goes being treated for it.    Arthritis    Back pain    Surgery in 2006    Black stools    Only time    Bloating    Nothing consistent    Blood in the stool    Only time    Blurred vision    legally blind in right eye    Chronic cough    for 3 weeks    Decorative tattoo    Left upper arm    Depression    Diarrhea, unspecified    Once every few months    Esophageal reflux    Heartburn    On and off not consistent    Hemorrhoids    Havent had in while    History of depression    Last few years     History of mental disorder    Aniexty, Panic attacks    Indigestion    On and off not consistent    Pain with bowel movements    Once every few months brings on cold sweat    Painful swallowing    When I eat too fast it occurs    Shortness of breath    for 3 weeks    Sleep disturbance    Stress    This year hasnt helped    Wears glasses    Glasses    Wheezing    for 3 weeks       Past Surgical History:   Procedure Laterality Date    Back surgery  2006    L5, S1    Colonoscopy  2007    Other surgical history  2020    Left ulnar nerve surgery    Skin surgery  10/02/2017    Excision of a Cyst - L mid back    Spine surgery procedure unlisted  2006    L5 S1 fusion and disc replacement       Family History   Problem Relation Age of Onset    Prostate Cancer Father             Mental Disorder Father     Ovarian Cancer Maternal Grandmother          in     Heart Attack Maternal Grandfather     Heart Attack Paternal Grandmother     Mental Disorder Paternal Grandmother     Heart Attack Paternal Grandfather     Mental Disorder Brother        Social History     Socioeconomic History    Marital status:      Spouse name: Not on file    Number of children: Not on file    Years of education: Not on file    Highest education level: Not on file   Occupational History    Not on file   Tobacco Use    Smoking status: Never    Smokeless tobacco: Never    Tobacco comments:     Handful on cigars over the years   Vaping Use    Vaping status: Never Used   Substance and Sexual Activity    Alcohol use: Not Currently     Comment: Not with the medication I'm on.    Drug use: Never    Sexual activity: Not on file   Other Topics Concern    Not on file   Social History Narrative    Not on file     Social Drivers of Health     Financial Resource Strain: Not on file   Food Insecurity: Not on file   Transportation Needs: Not on file   Physical Activity: Not on file   Stress: Not on file   Social Connections: Not on file    Housing Stability: Not on file        Current Outpatient Medications   Medication Instructions    acetaminophen-codeine 300-30 MG Oral Tab TAKE 1 TABLET BY MOUTH EVERY 6 HOURS FOR 5 DAYS AS NEEDED    ascorbic acid (VITAMIN C) 250 mg, Daily    baclofen 5 MG Oral Tab     clonazePAM (KLONOPIN) 0.5 mg, 2 times daily    clonazePAM 1 MG Oral Tab No dose, route, or frequency recorded.    FLUoxetine 20 MG Oral Cap TAKE ONE CAPSULE BY MOUTH ONCE EVERY MORNING WITH 40MG    FLUoxetine HCl (PROZAC) 40 mg, Daily    lidocaine 5 % External Patch 1 patch, Transdermal, Every 24 hours    LORazepam 0.5 MG Oral Tab As needed    Multiple Vitamins-Minerals (MULTIVITAMIN ADULT EXTRA C OR) Take by mouth.    Omeprazole 40 mg, Oral, 2 times daily before meals, 30 min prior to meals    Probiotic Product (PROBIOTIC-10 OR) Take by mouth.    traZODone (DESYREL) 150 mg, Nightly    Zinc 50 MG Oral Cap No dose, route, or frequency recorded.       Allergies[1]      Physical Exam:   Vitals:    11/11/24 0806   Weight: 230 lb (104.3 kg)   Height: 5' 8\" (1.727 m)     Estimated body mass index is 34.97 kg/m² as calculated from the following:    Height as of this encounter: 5' 8\" (1.727 m).    Weight as of this encounter: 230 lb (104.3 kg).    Constitutional: No acute distress, well nourished.  Eyes: Anicteric sclera, pink conjunctiva  Ears, Nose, Mouth and Throat: Normocephalic, atraumatic, moist mucous membranes  Cardiovascular: No pitting edema or varicosities in the lower extremities  Respiratory: No respiratory distress, normal respiratory rhythm and effort   Neurological:  Oriented to person, place, and time.  Psychological:  Appropriate mood and affect.      Comprehensive Right Hip Exam:      Gait: Antalgic  Leg lengths: No obvious limb length discrepancy  Inspection: No erythema, ecchymoses, or wounds. No rash  Palpation: None  ROM: Flexion: 100 degrees, limited ability to bring knee to chest  Internal Rotation: 20 degrees  External Rotation: 40  degrees  ROM causes pain?: Groin pain is not reproduced with internal rotation, but reports medial thigh pain with external rotation  Strength: Intact 5/5 strength with IP, Quadriceps, TA, GS, and EHL  Sensation: Grossly intact to light touch over SPN/DPN/Tibial/Sural nerve distributions  Special tests: Stinchfield: painful; Log roll: not painful; Trendelenberg: Negative  Vasc: Warm perfused extremity    Imaging:   XR AP Pelvis and 2 views AP and Lat of the Right Hip were personally reviewed and interpreted    There are mild degenerative changes of the hip without loss of joint space, subchondral sclerosis, or osteophyte formation  No fracture or dislocation noted        Assessment:     ICD-10-CM    1. Right hip pain  M25.551 MRI HIPS, RIGHT (CPT=73721)      2. Low back pain radiating to lower extremity  M54.50     M79.606              Plan:     He does not have significant degenerative changes about the hip  I suspect there is some component of his pain, especially lateral that may be coming from residual radiculopathy vs IT band syndrome   Recommend obtaining an MRI of the Hip to evaluate for adductor tendon injury vs labral pathology to explain his groin and medial thigh pain  Discussed with him that this may also be related to his inguinal hernia    Likely will require course of physical therapy but pending results of MRI      Thank you very much for allowing me to participate in the care of this patient. If you have any questions, please do not hesitate to contact me.      Skyler Robles MD  Adult Hip and Knee Reconstruction    Department of Orthopaedic Surgery  [facility]     [address] [place] [address]  [address] [place] [address]     t: [phone]  f: [phone]       [email or web address]       [1]   Allergies  Allergen Reactions    Vancomycin ANAPHYLAXIS

## 2024-11-22 ENCOUNTER — HOSPITAL ENCOUNTER (OUTPATIENT)
Dept: MRI IMAGING | Facility: HOSPITAL | Age: 49
Discharge: HOME | End: 2024-11-22
Attending: STUDENT IN AN ORGANIZED HEALTH CARE EDUCATION/TRAINING PROGRAM
Payer: COMMERCIAL

## 2024-11-22 DIAGNOSIS — M25.551 RIGHT HIP PAIN: ICD-10-CM

## 2024-11-22 PROCEDURE — 73721 MRI JNT OF LWR EXTRE W/O DYE: CPT | Performed by: STUDENT IN AN ORGANIZED HEALTH CARE EDUCATION/TRAINING PROGRAM

## 2024-11-27 ENCOUNTER — TELEPHONE (OUTPATIENT)
Facility: CLINIC | Age: 49
End: 2024-11-27

## 2024-12-02 ENCOUNTER — TELEPHONE (OUTPATIENT)
Facility: LOCATION | Age: 49
End: 2024-12-02

## 2024-12-02 ENCOUNTER — OFFICE VISIT (OUTPATIENT)
Facility: CLINIC | Age: 49
End: 2024-12-02
Payer: COMMERCIAL

## 2024-12-02 VITALS — BODY MASS INDEX: 34.86 KG/M2 | HEIGHT: 68 IN | WEIGHT: 230 LBS

## 2024-12-02 DIAGNOSIS — S76.011A TEAR OF RIGHT GLUTEUS MINIMUS TENDON, INITIAL ENCOUNTER: ICD-10-CM

## 2024-12-02 DIAGNOSIS — K40.20 BILATERAL INGUINAL HERNIA WITHOUT OBSTRUCTION OR GANGRENE, RECURRENCE NOT SPECIFIED: Primary | ICD-10-CM

## 2024-12-02 DIAGNOSIS — S76.011A TEAR OF RIGHT GLUTEUS MEDIUS TENDON, INITIAL ENCOUNTER: Primary | ICD-10-CM

## 2024-12-02 PROCEDURE — 99213 OFFICE O/P EST LOW 20 MIN: CPT | Performed by: STUDENT IN AN ORGANIZED HEALTH CARE EDUCATION/TRAINING PROGRAM

## 2024-12-02 PROCEDURE — 3008F BODY MASS INDEX DOCD: CPT | Performed by: STUDENT IN AN ORGANIZED HEALTH CARE EDUCATION/TRAINING PROGRAM

## 2024-12-02 NOTE — PROGRESS NOTES
Orthopaedic Surgery  [address] [address] [address]   [phone]       Chief Complaint:  Right Hip Pain    Interval History: 12/2/24  Symptoms are ongoing  He has posterolateral hip pain as well as groin pain, sometimes can feel in his scrotum  It is worse with walking, prolonged sitting  Limited in ADLs  Here to review MRI Hip      History of Present Illness:   Isaac Perez is a 49 year old male seen in clinic today as new for evaluation of their right hip. Symptoms have been present for the last 3 months. It began after he went on a hike. He reports that on the way down, it was uneven and he felt a pop. Patient reports weakness and numbness of the lower extremity that took some time to improve. Since that time, he has been evaluated by orthopedic spine at [facility]. He was treated with injections in the back which he reports seem to have helped. However he still has some persistent pain and he was referred to see for evaluation of his hip. Pain is located at the posterolateral hip and thigh, as well as groin to the medial thigh. Their joint pain interferes with their activities of daily life and work as a  with walking, going up or down stairs, bending. Their condition is stable. No prior hip pain or surgeries to the hip. He has bilateral inguinal hernias as well and is scheduled to undergo surgery in January 2025 with general surgery.    Prior imaging studies have included none. No treatment thus far for the hip.     Assistive devices used: none      PMH/PSH/Family History/Social History/Meds/Allergies:   Past Medical History:    Abdominal pain    On and off not consistent    Allergic rhinitis    Anxiety    Comes and goes being treated for it.    Arthritis    Back pain    Surgery in 2006    Black stools    Only time    Bloating    Nothing consistent    Blood in the stool    Only time    Blurred vision    legally blind in right eye    Chronic cough    for 3 weeks     Decorative tattoo    Left upper arm    Depression    Diarrhea, unspecified    Once every few months    Esophageal reflux    Heartburn    On and off not consistent    Hemorrhoids    Havent had in while    History of depression    Last few years    History of mental disorder    Aniexty, Panic attacks    Indigestion    On and off not consistent    Pain with bowel movements    Once every few months brings on cold sweat    Painful swallowing    When I eat too fast it occurs    Shortness of breath    for 3 weeks    Sleep disturbance    Stress    This year hasnt helped    Wears glasses    Glasses    Wheezing    for 3 weeks       Past Surgical History:   Procedure Laterality Date    Back surgery  2006    L5, S1    Colonoscopy  2007    Other surgical history  2020    Left ulnar nerve surgery    Skin surgery  10/02/2017    Excision of a Cyst - L mid back    Spine surgery procedure unlisted  2006    L5 S1 fusion and disc replacement       Family History   Problem Relation Age of Onset    Prostate Cancer Father             Mental Disorder Father     Ovarian Cancer Maternal Grandmother          in     Heart Attack Maternal Grandfather     Heart Attack Paternal Grandmother     Mental Disorder Paternal Grandmother     Heart Attack Paternal Grandfather     Mental Disorder Brother        Social History     Socioeconomic History    Marital status:      Spouse name: Not on file    Number of children: Not on file    Years of education: Not on file    Highest education level: Not on file   Occupational History    Not on file   Tobacco Use    Smoking status: Never    Smokeless tobacco: Never    Tobacco comments:     Handful on cigars over the years   Vaping Use    Vaping status: Never Used   Substance and Sexual Activity    Alcohol use: Not Currently     Comment: Not with the medication I'm on.    Drug use: Never    Sexual activity: Not on file   Other Topics Concern    Not on file   Social History  Narrative    Not on file     Social Drivers of Health     Financial Resource Strain: Not on file   Food Insecurity: Not on file   Transportation Needs: Not on file   Physical Activity: Not on file   Stress: Not on file   Social Connections: Not on file   Housing Stability: Not on file        Current Outpatient Medications   Medication Instructions    acetaminophen-codeine 300-30 MG Oral Tab TAKE 1 TABLET BY MOUTH EVERY 6 HOURS FOR 5 DAYS AS NEEDED    ascorbic acid (VITAMIN C) 250 mg, Daily    baclofen 5 MG Oral Tab     clonazePAM (KLONOPIN) 0.5 mg, 2 times daily    clonazePAM 1 MG Oral Tab No dose, route, or frequency recorded.    FLUoxetine 20 MG Oral Cap TAKE ONE CAPSULE BY MOUTH ONCE EVERY MORNING WITH 40MG    FLUoxetine HCl (PROZAC) 40 mg, Daily    lidocaine 5 % External Patch 1 patch, Transdermal, Every 24 hours    LORazepam 0.5 MG Oral Tab As needed    Multiple Vitamins-Minerals (MULTIVITAMIN ADULT EXTRA C OR) Take by mouth.    Omeprazole 40 mg, Oral, 2 times daily before meals, 30 min prior to meals    Probiotic Product (PROBIOTIC-10 OR) Take by mouth.    traZODone (DESYREL) 150 mg, Nightly    Zinc 50 MG Oral Cap No dose, route, or frequency recorded.       Allergies[1]      Physical Exam:   Vitals:    12/02/24 0953   Weight: 230 lb (104.3 kg)   Height: 5' 8\" (1.727 m)     Estimated body mass index is 34.97 kg/m² as calculated from the following:    Height as of this encounter: 5' 8\" (1.727 m).    Weight as of this encounter: 230 lb (104.3 kg).    Constitutional: No acute distress, well nourished.  Eyes: Anicteric sclera, pink conjunctiva  Ears, Nose, Mouth and Throat: Normocephalic, atraumatic, moist mucous membranes  Cardiovascular: No pitting edema or varicosities in the lower extremities  Respiratory: No respiratory distress, normal respiratory rhythm and effort   Neurological:  Oriented to person, place, and time.  Psychological:  Appropriate mood and affect.      Comprehensive Right Hip Exam:      Gait:  Antalgic  Leg lengths: No obvious limb length discrepancy  Inspection: No erythema, ecchymoses, or wounds. No rash  Palpation: None  ROM: Flexion: 100 degrees, limited ability to bring knee to chest  Internal Rotation: 20 degrees  External Rotation: 40 degrees  ROM causes pain?: Groin pain is not reproduced with internal rotation, but reports medial thigh pain with external rotation  Strength: Intact 5/5 strength with IP, Quadriceps, TA, GS, and EHL  Sensation: Grossly intact to light touch over SPN/DPN/Tibial/Sural nerve distributions  Special tests: Stinchfield: painful; Log roll: not painful; Trendelenberg: Negative  Vasc: Warm perfused extremity    Imaging:   XR AP Pelvis and 2 views AP and Lat of the Right Hip were personally reviewed and interpreted    There are mild degenerative changes of the hip without loss of joint space, subchondral sclerosis, or osteophyte formation  No fracture or dislocation noted    MRI with anterior labral tear, as well as partial thickness gluteus medius and minimus tears    Assessment:     ICD-10-CM    1. Tear of right gluteus medius tendon, initial encounter  S76.011A       2. Tear of right gluteus minimus tendon, initial encounter  S76.011A              Plan:   He does not have significant degenerative changes about the hip  Lateral hip pain is likely coming from his gluteal musculature tears  Groin pain can be coming from labral tear vs inguinal hernia, though I am leaning toward the latter given his scrotal pain.. Discussed we can see how much of his pain remains after his inguinal hernias are addressed. He is scheduled for inguinal hernia repair with general surgery in 4 weeks    Referral provided for physical therapy  He will also follow up to see Dr Yoshi Napoles to see if he is a candidate for gluteus medius or labral repair  Work note provided for 4 weeks off given he is unable to function and walk long distances as a       Skyler Robles MD  Adult Hip and Knee  Reconstruction    Department of Orthopaedic Surgery  [facility]     [address] [place] [address]  [address] [place] [address]     t: [phone]  f: [phone]       [email or web address]         [1]   Allergies  Allergen Reactions    Vancomycin ANAPHYLAXIS

## 2024-12-02 NOTE — TELEPHONE ENCOUNTER
No prior authorization required for cpt code 05548. Ref #: [id number]. Spoke with Aster @ 12:28pm on 12/2

## 2024-12-04 ENCOUNTER — OFFICE VISIT (OUTPATIENT)
Dept: ORTHOPEDICS CLINIC | Facility: CLINIC | Age: 49
End: 2024-12-04
Payer: COMMERCIAL

## 2024-12-04 DIAGNOSIS — M70.61 TROCHANTERIC BURSITIS OF RIGHT HIP: Primary | ICD-10-CM

## 2024-12-04 PROCEDURE — 99214 OFFICE O/P EST MOD 30 MIN: CPT | Performed by: STUDENT IN AN ORGANIZED HEALTH CARE EDUCATION/TRAINING PROGRAM

## 2024-12-04 NOTE — PROGRESS NOTES
NURSING INTAKE COMMENTS:   Chief Complaint   Patient presents with    Hip Pain     R hip f/u- stated injured while hiking in 7/2024- rates pain 6/10 most of the time- was referred and seen by Dr. Robles on 12/02/2024- has xray and MRI in the system       HPI: This 49 year old male presents today with complaints of right hip pain. He reports that the pain initially started In July of 2024 on a hike when he felt a pop. Since that time he has had intermittent, right hip pain, that has affected his ability to perform his job as a . Notably, he also was diagnosed with an inguinal hernia and will undergo surgery roughly two days following today's visit. He describes the pain as sharp, affects his ability to sleep at night and is located in his groin, lower back, and lateral aspect of the hip. With respect to severity, he reports the pain is 6/10 at its most severe, and 6/10 today. The pain limits his ability to ambulate and perform his activities of daily living. he  reports a history of injury described prior.  He has taken antiinflammatory medications. He denies prior intraarticular cortisone injections but he has had lumbar epidural injections. Notably, he has a history of fusion L5-S1 in the past.     Past Medical History:    Abdominal pain    On and off not consistent    Allergic rhinitis    Anxiety    Comes and goes being treated for it.    Arthritis    Back pain    Surgery in 2006    Black stools    Only time    Bloating    Nothing consistent    Blood in the stool    Only time    Blurred vision    legally blind in right eye    Cancer (HCC)    right shoulder melanoma    Chronic cough    for 3 weeks    Decorative tattoo    Left upper arm    Depression    Diarrhea, unspecified    Once every few months    Diverticulosis of large intestine    Esophageal reflux    Heartburn    On and off not consistent    Hemorrhoids    Havent had in while    History of depression    Last few years    History of mental disorder     Aniexty, Panic attacks    Indigestion    On and off not consistent    Osteoarthritis    right hip    Pain with bowel movements    Once every few months brings on cold sweat    Painful swallowing    When I eat too fast it occurs    Sleep apnea    \"mild\" case- no device    Sleep disturbance    Stress    This year hasnt helped    Visual impairment    right eye partial blind    Wears glasses    Glasses    Wheezing    for 3 weeks     Past Surgical History:   Procedure Laterality Date    Back surgery  02/2006    L5, S1    Colonoscopy  12/2007    Colonoscopy      Other surgical history  Feb 2020    Left ulnar nerve surgery    Other surgical history      excision right shoulder melanoma    Skin surgery  10/02/2017    Excision of a Cyst - L mid back    Spine surgery procedure unlisted  02/2006    L5 S1 fusion and disc replacement    Upper gi endoscopy,exam       Current Outpatient Medications   Medication Sig Dispense Refill    clonazePAM 0.5 MG Oral Tab Take 1 tablet (0.5 mg total) by mouth 2 (two) times daily.      acetaminophen-codeine 300-30 MG Oral Tab TAKE 1 TABLET BY MOUTH EVERY 6 HOURS FOR 5 DAYS AS NEEDED      Omeprazole 40 MG Oral Capsule Delayed Release Take 1 capsule (40 mg total) by mouth 2 (two) times daily before meals. 30 min prior to meals 180 capsule 3    FLUoxetine 20 MG Oral Cap TAKE ONE CAPSULE BY MOUTH ONCE EVERY MORNING WITH 40MG      ascorbic acid, VITAMIN C, 250 MG Oral Tab Take 1 tablet (250 mg total) by mouth daily.      traZODone HCl 150 MG Oral Tab Take 1 tablet (150 mg total) by mouth nightly.      Zinc 50 MG Oral Cap       FLUoxetine HCl 40 MG Oral Cap Take 1 capsule (40 mg total) by mouth daily.      LORazepam 0.5 MG Oral Tab as needed.        Probiotic Product (PROBIOTIC-10 OR) Take by mouth.      Multiple Vitamins-Minerals (MULTIVITAMIN ADULT EXTRA C OR) Take by mouth.       Allergies[1]  Family History   Problem Relation Age of Onset    Prostate Cancer Father         2007    Mental  Disorder Father     Ovarian Cancer Maternal Grandmother          in     Heart Attack Maternal Grandfather     Heart Attack Paternal Grandmother     Mental Disorder Paternal Grandmother     Heart Attack Paternal Grandfather     Mental Disorder Brother        Social History     Occupational History    Not on file   Tobacco Use    Smoking status: Never    Smokeless tobacco: Never    Tobacco comments:     Handful on cigars over the years   Vaping Use    Vaping status: Never Used   Substance and Sexual Activity    Alcohol use: Not Currently     Comment: Not with the medication I'm on.    Drug use: Never    Sexual activity: Not on file        Review of Systems:  GENERAL: denies fevers, chills, night sweats, fatigue, unintentional weight loss/gain  SKIN: denies skin lesions, open sores, rash  HEENT:denies recent vision change, new nasal congestion,hearing loss, tinnitus, sore throat, headaches  RESPIRATORY: denies new shortness of breath, cough, asthma, wheezing  CARDIOVASCULAR: denies chest pain, leg cramps with exertion, palpitations, leg swelling  GI: denies abdominal pain, nausea, vomiting, diarrhea, constipation, hematochezia, worsening heartburn or stomach ulcers  : denies dysuria, hematuria, incontinence, increased frequency, urgency, difficulty urinating  MUSCULOSKELETAL: denies musculoskeletal complaints other than in HPI  NEURO: denies numbness, tingling, weakness, balance issues, dizziness, memory loss  PSYCHIATRIC: denies Hx of depression, anxiety, other psychiatric disorders  HEMATOLOGIC: denies blood clots, anemia, blood clotting disorders, blood transfusion  ENDOCRINE: denies autoimmune disease, thyroid issues, or diabetes  ALLERGY: denies asthma, seasonal allergies    Physical Examination:    There were no vitals taken for this visit.  Constitutional: appears well hydrated, alert and responsive, no acute distress noted  Extremities: Normal  Neurological: Normal    Right Hip Exam  Ambulates with  normal gait  ROM 0-85 with pain at terminal forced flexion. Some groin pain however primarily posterior hip and lower back  Internal Rotation 10 degrees  External Rotation 45  FADIR Equivocal  DONAVAN Equivocal  Trochanteric pain sign Positive  Stinchfield Positive      Imaging:   All imaging was independently reviewed and interpreted  MRI HIPS, RIGHT (CPT=73721)    Result Date: 11/22/2024  PROCEDURE:  MRI HIPS, RIGHT (CPT=73721)  COMPARISON:  None.  INDICATIONS:  M25.551 Right hip pain  TECHNIQUE:  A comprehensive examination was performed utilizing a variety of imaging planes and imaging parameters to optimize visualization of suspected pathology.  Images were performed without contrast.  PATIENT STATED HISTORY: (As transcribed by Technologist)  Right hip pain after a hike. No known injury    FINDINGS:  OSSEOUS STRUCTURES:  No fracture, stress injury, or evidence of femoral head AVN.  Alpha angle 55 degrees. MUSCULATURE:  No acute strain, edema, or atrophy.  No evidence of ischiofemoral impingement or piriformis syndrome. TENDONS:  Moderate insertional tendinopathy and low-grade partial-thickness interstitial tearing of the gluteus minimus (series 6, image 13, series 8, image 36).  Moderate insertional tendinopathy with high-grade partial thickness tearing of the anterior  3rd of the tendon at the trochanteric insertion, the tear encompassing a 10 mm AP length (series 8, image 38). HIP JOINT:  No significant arthritis or chondromalacia.  Linear signal extending to the substance of the acetabular labrum within the anterosuperior or joint consistent with labral tear (series 6, image 18, series 7, image 14).             CONCLUSION:   1. Linear signal extending into the substance of the acetabular labrum within the anterosuperior quadrant consistent with labral tear.  2. Low-grade partial-thickness interstitial tearing of the gluteus minimus tendon at the trochanteric insertion.  Findings are on a background of moderate  tendinopathy.   3. High-grade partial-thickness tearing of the anterior 3rd of the distal gluteus medius tendon at the trochanteric insertion over a 10 mm length.  Findings are on a background of moderate tendinopathy.  4. 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   LOCATION:  [facility]   Dictated by (CST): Jadon Carranza MD on 11/22/2024 at 11:40 AM     Finalized by (CST): Jadon Carranza MD on 11/22/2024 at 2:33 PM       XR HIP W OR WO PELVIS 2 OR 3 VIEWS, RIGHT (CPT=73502)    Result Date: 11/11/2024  PROCEDURE:  XR HIP W OR WO PELVIS 2 OR 3 VIEWS, RIGHT ( CPT=73502)  TECHNIQUE:  Unilateral 2 to 3 views of the hip and pelvis if performed.  COMPARISON:  None.  INDICATIONS:  M25.551 Pain of right hip  PATIENT STATED HISTORY: (As transcribed by Technologist)  Ortho evaluation. Pt states he has had hip pain for several months, no known injury.    FINDINGS:  No acute fracture or dislocation.  There is mild joint space narrowing and small osteophyte formation compatible with mild osteoarthritis of the right hip.  No radiopaque foreign body.  The visualized pelvis is intact.            CONCLUSION:  No acute osseous findings.  Mild right hip osteoarthritis.   LOCATION:  [facility]   Dictated by (CST): Jadon Gomes MD on 11/11/2024 at 8:00 AM     Finalized by (CST): Jadon Gomes MD on 11/11/2024 at 8:01 AM          Labs:  Lab Results   Component Value Date    WBC 9.1 10/16/2024    HGB 16.1 10/16/2024    .0 10/16/2024      Lab Results   Component Value Date     (H) 10/16/2024    BUN 13 10/16/2024    CREATSERUM 1.00 10/16/2024    GFRNAA 103 05/02/2022    GFRAA 119 05/02/2022        Assessment and Plan:  There are no diagnoses linked to this encounter.    Assessment: Isaac is a very pleasant 49-year-old male with a history of right hip pain consistent with greater trochanteric bursitis degenerative changes in the right hip joint and an associated labral tear    Plan:  Isaac is a very pleasant 49-year-old male.  Notably his groin pain in particular is potentially coming from the hernia that he will have repaired over the next few days.  From a trial of nonoperative treatment in the form of corticosteroid injections.  He is referred to Dr. Alston.  There he will undergo right-sided trochanteric bursa and hip injections.  He will follow-up with us on an as-needed basis.  These injections will be both diagnostic and therapeutic.  All relevant questions were answered at today's visit and he expressed clear understanding of the treatment plan    Follow Up: No follow-ups on file.    Mitchell White MD       [1]   Allergies  Allergen Reactions    Vancomycin ANAPHYLAXIS

## 2024-12-06 ENCOUNTER — ANESTHESIA (OUTPATIENT)
Dept: SURGERY | Facility: HOSPITAL | Age: 49
End: 2024-12-06
Payer: COMMERCIAL

## 2024-12-06 ENCOUNTER — ANESTHESIA EVENT (OUTPATIENT)
Dept: SURGERY | Facility: HOSPITAL | Age: 49
End: 2024-12-06
Payer: COMMERCIAL

## 2024-12-06 ENCOUNTER — HOSPITAL ENCOUNTER (OUTPATIENT)
Facility: HOSPITAL | Age: 49
Setting detail: HOSPITAL OUTPATIENT SURGERY
Discharge: HOME | End: 2024-12-06
Attending: STUDENT IN AN ORGANIZED HEALTH CARE EDUCATION/TRAINING PROGRAM | Admitting: STUDENT IN AN ORGANIZED HEALTH CARE EDUCATION/TRAINING PROGRAM
Payer: COMMERCIAL

## 2024-12-06 VITALS
SYSTOLIC BLOOD PRESSURE: 127 MMHG | HEART RATE: 93 BPM | HEIGHT: 68 IN | DIASTOLIC BLOOD PRESSURE: 75 MMHG | OXYGEN SATURATION: 94 % | RESPIRATION RATE: 16 BRPM | BODY MASS INDEX: 35.01 KG/M2 | WEIGHT: 231 LBS | TEMPERATURE: 97 F

## 2024-12-06 DIAGNOSIS — K40.90 UNILATERAL INGUINAL HERNIA WITHOUT OBSTRUCTION OR GANGRENE, RECURRENCE NOT SPECIFIED: Primary | ICD-10-CM

## 2024-12-06 PROCEDURE — 8E0W4CZ ROBOTIC ASSISTED PROCEDURE OF TRUNK REGION, PERCUTANEOUS ENDOSCOPIC APPROACH: ICD-10-PCS | Performed by: STUDENT IN AN ORGANIZED HEALTH CARE EDUCATION/TRAINING PROGRAM

## 2024-12-06 PROCEDURE — 0YQ54ZZ REPAIR RIGHT INGUINAL REGION, PERCUTANEOUS ENDOSCOPIC APPROACH: ICD-10-PCS | Performed by: STUDENT IN AN ORGANIZED HEALTH CARE EDUCATION/TRAINING PROGRAM

## 2024-12-06 PROCEDURE — S2900 ROBOTIC SURGICAL SYSTEM: HCPCS | Performed by: STUDENT IN AN ORGANIZED HEALTH CARE EDUCATION/TRAINING PROGRAM

## 2024-12-06 PROCEDURE — 49650 LAP ING HERNIA REPAIR INIT: CPT | Performed by: PHYSICIAN ASSISTANT

## 2024-12-06 PROCEDURE — 49650 LAP ING HERNIA REPAIR INIT: CPT | Performed by: STUDENT IN AN ORGANIZED HEALTH CARE EDUCATION/TRAINING PROGRAM

## 2024-12-06 DEVICE — LAPAROSCOPIC SELF-FIXATING MESH POLYESTER WITH POLYLACTIC ACID GRIPS AND COLLAGEN FILM
Type: IMPLANTABLE DEVICE | Site: ABDOMEN | Status: FUNCTIONAL
Brand: PROGRIP

## 2024-12-06 RX ORDER — DEXAMETHASONE SODIUM PHOSPHATE 4 MG/ML
VIAL (ML) INJECTION AS NEEDED
Status: DISCONTINUED | OUTPATIENT
Start: 2024-12-06 | End: 2024-12-06 | Stop reason: SURG

## 2024-12-06 RX ORDER — OXYCODONE HYDROCHLORIDE 5 MG/1
5 TABLET ORAL EVERY 4 HOURS PRN
Qty: 15 TABLET | Refills: 0 | Status: SHIPPED | OUTPATIENT
Start: 2024-12-06

## 2024-12-06 RX ORDER — PROCHLORPERAZINE EDISYLATE 5 MG/ML
5 INJECTION INTRAMUSCULAR; INTRAVENOUS EVERY 8 HOURS PRN
Status: DISCONTINUED | OUTPATIENT
Start: 2024-12-06 | End: 2024-12-06

## 2024-12-06 RX ORDER — MEPERIDINE HYDROCHLORIDE 25 MG/ML
12.5 INJECTION INTRAMUSCULAR; INTRAVENOUS; SUBCUTANEOUS AS NEEDED
Status: DISCONTINUED | OUTPATIENT
Start: 2024-12-06 | End: 2024-12-06

## 2024-12-06 RX ORDER — SCOLOPAMINE TRANSDERMAL SYSTEM 1 MG/1
1 PATCH, EXTENDED RELEASE TRANSDERMAL ONCE
Status: DISCONTINUED | OUTPATIENT
Start: 2024-12-06 | End: 2024-12-06 | Stop reason: HOSPADM

## 2024-12-06 RX ORDER — BUPIVACAINE HYDROCHLORIDE 2.5 MG/ML
INJECTION, SOLUTION EPIDURAL; INFILTRATION; INTRACAUDAL AS NEEDED
Status: DISCONTINUED | OUTPATIENT
Start: 2024-12-06 | End: 2024-12-06

## 2024-12-06 RX ORDER — HYDROMORPHONE HYDROCHLORIDE 1 MG/ML
0.4 INJECTION, SOLUTION INTRAMUSCULAR; INTRAVENOUS; SUBCUTANEOUS EVERY 5 MIN PRN
Status: DISCONTINUED | OUTPATIENT
Start: 2024-12-06 | End: 2024-12-06

## 2024-12-06 RX ORDER — HEPARIN SODIUM 5000 [USP'U]/ML
INJECTION, SOLUTION INTRAVENOUS; SUBCUTANEOUS
Status: COMPLETED
Start: 2024-12-06 | End: 2024-12-06

## 2024-12-06 RX ORDER — SODIUM CHLORIDE, SODIUM LACTATE, POTASSIUM CHLORIDE, CALCIUM CHLORIDE 600; 310; 30; 20 MG/100ML; MG/100ML; MG/100ML; MG/100ML
INJECTION, SOLUTION INTRAVENOUS CONTINUOUS
Status: DISCONTINUED | OUTPATIENT
Start: 2024-12-06 | End: 2024-12-06

## 2024-12-06 RX ORDER — HYDROCODONE BITARTRATE AND ACETAMINOPHEN 5; 325 MG/1; MG/1
2 TABLET ORAL ONCE AS NEEDED
Status: COMPLETED | OUTPATIENT
Start: 2024-12-06 | End: 2024-12-06

## 2024-12-06 RX ORDER — ACETAMINOPHEN 500 MG
1000 TABLET ORAL ONCE
Status: DISCONTINUED | OUTPATIENT
Start: 2024-12-06 | End: 2024-12-06 | Stop reason: HOSPADM

## 2024-12-06 RX ORDER — NEOSTIGMINE METHYLSULFATE 1 MG/ML
INJECTION INTRAVENOUS AS NEEDED
Status: DISCONTINUED | OUTPATIENT
Start: 2024-12-06 | End: 2024-12-06 | Stop reason: SURG

## 2024-12-06 RX ORDER — HEPARIN SODIUM 5000 [USP'U]/ML
5000 INJECTION, SOLUTION INTRAVENOUS; SUBCUTANEOUS ONCE
Status: COMPLETED | OUTPATIENT
Start: 2024-12-06 | End: 2024-12-06

## 2024-12-06 RX ORDER — ROCURONIUM BROMIDE 10 MG/ML
INJECTION, SOLUTION INTRAVENOUS AS NEEDED
Status: DISCONTINUED | OUTPATIENT
Start: 2024-12-06 | End: 2024-12-06 | Stop reason: SURG

## 2024-12-06 RX ORDER — METOCLOPRAMIDE HYDROCHLORIDE 5 MG/ML
INJECTION INTRAMUSCULAR; INTRAVENOUS AS NEEDED
Status: DISCONTINUED | OUTPATIENT
Start: 2024-12-06 | End: 2024-12-06 | Stop reason: SURG

## 2024-12-06 RX ORDER — HYDROCODONE BITARTRATE AND ACETAMINOPHEN 5; 325 MG/1; MG/1
1 TABLET ORAL ONCE AS NEEDED
Status: COMPLETED | OUTPATIENT
Start: 2024-12-06 | End: 2024-12-06

## 2024-12-06 RX ORDER — GLYCOPYRROLATE 0.2 MG/ML
INJECTION, SOLUTION INTRAMUSCULAR; INTRAVENOUS AS NEEDED
Status: DISCONTINUED | OUTPATIENT
Start: 2024-12-06 | End: 2024-12-06 | Stop reason: SURG

## 2024-12-06 RX ORDER — HYDROMORPHONE HYDROCHLORIDE 1 MG/ML
0.6 INJECTION, SOLUTION INTRAMUSCULAR; INTRAVENOUS; SUBCUTANEOUS EVERY 5 MIN PRN
Status: DISCONTINUED | OUTPATIENT
Start: 2024-12-06 | End: 2024-12-06

## 2024-12-06 RX ORDER — HYDROMORPHONE HYDROCHLORIDE 1 MG/ML
0.2 INJECTION, SOLUTION INTRAMUSCULAR; INTRAVENOUS; SUBCUTANEOUS EVERY 5 MIN PRN
Status: DISCONTINUED | OUTPATIENT
Start: 2024-12-06 | End: 2024-12-06

## 2024-12-06 RX ORDER — ONDANSETRON 2 MG/ML
INJECTION INTRAMUSCULAR; INTRAVENOUS AS NEEDED
Status: DISCONTINUED | OUTPATIENT
Start: 2024-12-06 | End: 2024-12-06 | Stop reason: SURG

## 2024-12-06 RX ORDER — ONDANSETRON 2 MG/ML
4 INJECTION INTRAMUSCULAR; INTRAVENOUS EVERY 6 HOURS PRN
Status: DISCONTINUED | OUTPATIENT
Start: 2024-12-06 | End: 2024-12-06

## 2024-12-06 RX ORDER — MIDAZOLAM HYDROCHLORIDE 1 MG/ML
1 INJECTION INTRAMUSCULAR; INTRAVENOUS EVERY 5 MIN PRN
Status: DISCONTINUED | OUTPATIENT
Start: 2024-12-06 | End: 2024-12-06

## 2024-12-06 RX ORDER — ACETAMINOPHEN 500 MG
1000 TABLET ORAL ONCE AS NEEDED
Status: COMPLETED | OUTPATIENT
Start: 2024-12-06 | End: 2024-12-06

## 2024-12-06 RX ORDER — NALOXONE HYDROCHLORIDE 0.4 MG/ML
80 INJECTION, SOLUTION INTRAMUSCULAR; INTRAVENOUS; SUBCUTANEOUS AS NEEDED
Status: DISCONTINUED | OUTPATIENT
Start: 2024-12-06 | End: 2024-12-06

## 2024-12-06 RX ADMIN — NEOSTIGMINE METHYLSULFATE 4 MG: 1 INJECTION INTRAVENOUS at 12:22:00

## 2024-12-06 RX ADMIN — ROCURONIUM BROMIDE 20 MG: 10 INJECTION, SOLUTION INTRAVENOUS at 11:26:00

## 2024-12-06 RX ADMIN — GLYCOPYRROLATE 0.6 MG: 0.2 INJECTION, SOLUTION INTRAMUSCULAR; INTRAVENOUS at 12:22:00

## 2024-12-06 RX ADMIN — SODIUM CHLORIDE, SODIUM LACTATE, POTASSIUM CHLORIDE, CALCIUM CHLORIDE: 600; 310; 30; 20 INJECTION, SOLUTION INTRAVENOUS at 12:40:00

## 2024-12-06 RX ADMIN — METOCLOPRAMIDE HYDROCHLORIDE 10 MG: 5 INJECTION INTRAMUSCULAR; INTRAVENOUS at 11:16:00

## 2024-12-06 RX ADMIN — DEXAMETHASONE SODIUM PHOSPHATE 8 MG: 4 MG/ML VIAL (ML) INJECTION at 11:16:00

## 2024-12-06 RX ADMIN — ONDANSETRON 4 MG: 2 INJECTION INTRAMUSCULAR; INTRAVENOUS at 12:09:00

## 2024-12-06 RX ADMIN — ROCURONIUM BROMIDE 50 MG: 10 INJECTION, SOLUTION INTRAVENOUS at 10:58:00

## 2024-12-06 NOTE — ANESTHESIA PROCEDURE NOTES
Airway  Date/Time: 12/6/2024 11:00 AM  Urgency: elective      General Information and Staff    Patient location during procedure: OR  Anesthesiologist: Alton Webster MD  Performed: anesthesiologist   Performed by: Alton Webster MD  Authorized by: Alton Webster MD      Indications and Patient Condition  Indications for airway management: anesthesia  Sedation level: deep  Preoxygenated: yes  Patient position: sniffing  Mask difficulty assessment: 1 - vent by mask    Final Airway Details  Final airway type: endotracheal airway      Successful airway: ETT  Cuffed: yes   Successful intubation technique: direct laryngoscopy  Endotracheal tube insertion site: oral  Blade: GlideScope  Blade size: #4  ETT size (mm): 8.0    Cormack-Lehane Classification: grade IIA - partial view of glottis  Placement verified by: capnometry   Measured from: lips  ETT to lips (cm): 23  Number of attempts at approach: 1

## 2024-12-06 NOTE — DISCHARGE INSTRUCTIONS
Home Care Instructions  Robot-Assisted Laparoscopic Inguinal Hernia Repair          WHAT TO EXPECT  You may feel pain at the incisions or where your hernia used to be. This is due to stitches placed during the surgery.    You may feel pain and bruising in the groin. You may notice swelling of the scrotum. This is common and will resolve.    You may feel pain in the shoulders. This is due to irritation of the diaphragm by the air used to inflate the abdomen.    You may feel a sore throat. This is due to the breathing tube used during surgery.     You may feel mild nausea and vomiting for the first 24 hours, this should resolve quickly.    You may have constipation, especially if taking narcotic pain medications. If you have not had a bowel movement by 48 hours after surgery, take Miralax 17g (one cap full) every 12 hours until you have a bowel movement. If another 24 hours goes by without a bowel movement, then take a dose of magnesium citrate or milk of magnesia.     MEDICATIONS  Take 2 Extra Strength Tylenol (1000mg every) 8 hours for pain. For the first 3 days it is best to take the Tylenol every 8 hours even if you do not feel much pain. You may have more tylenol at 5 pm.     Take Advil (ibuprofen) 800mg every 8 hours or Alleve (naproxen) 500mg every 12 hours, also for the first 3 days. You may take ibuprofen at any time today.     For moderate to severe pain take one Oxycodone pill (5mg) every six hours as needed for pain. If you do not feel that narcotics are necessary you shouldn’t take them. If the pain is severe you can take two pills (10mg) every six hours.    Please ask your surgeon before resuming blood thinners such as Aspirin, Plavix, Coumadin, Warfarin, Eliquis, or Xarelto. All other home medications may be resumed as scheduled.     DIET  Start with a light and bland diet and slowly advance to regular food as your appetite improves. There are no specific food restrictions. Do not eat excessively. Eat  small frequent meals.     Drink plenty of water. Try to eat a healthy high fiber diet.    Do not drink alcohol (beer, wine, liquor) or use tobacco products.    WOUND CARE  The incisions are covered with Skin Glue. You can shower 24 hours after surgery and get the dressings wet.    The Skin Glue will stay on for 10 to 14 days after surgery.     Soap and water can get on the incisions but do not scrub the wounds. No hair dye or chemicals of any kind should get on the incisions.     Do not apply any topical ointments such as Neosporin or Hydrogen Peroxide.    Do not swim or submerge the incisions under water for 1 month.    ACTIVITY  Every day you should be up walking around the house. Do not lie in bed all day. Staying active prevents blood clots and pneumonia.    You can go up and down stairs. Do not lift more than 20 pounds or perform strenuous activity that requires straining the core muscles.    You may ride in a car but should not drive the car for at least one week.     APPOINTMENT  Please call our office at [phone] soon to make an appointment.    For questions or concerns please call our office between 8:30 a.m. and 5 p.m. Monday through Friday. The number above directs to the answering service after hours to reach the on-call physician.    Please call our office immediately for fever greater than 100.5, excess bleeding, inability to urinate, severe abdominal pain, severe diarrhea, uncontrollable vomiting.      For life threatening emergencies such as severe chest pain, difficulty breathing, or loss of conciousness call [phone].

## 2024-12-06 NOTE — ANESTHESIA POSTPROCEDURE EVALUATION
[facility]    Isaac Perez Patient Status:  Hospital Outpatient Surgery   Age/Gender 49 year old male MRN [id number]   Location [facility] POST ANESTHESIA CARE UNIT Attending Jarad Carranza MD   Hosp Day # 0 PCP Reza Yost MD       Anesthesia Post-op Note    XI ROBOT-ASSISTED LAPAROSCOPIC right HERNIA REPAIR    Procedure Summary       Date: 12/06/24 Room / Location:  MAIN OR  /  MAIN OR    Anesthesia Start: 1054 Anesthesia Stop: 1240    Procedure: XI ROBOT-ASSISTED LAPAROSCOPIC right HERNIA REPAIR (Right: Abdomen) Diagnosis:       Bilateral inguinal hernia without obstruction or gangrene, recurrence not specified      (Bilateral inguinal hernia without obstruction or gangrene, recurrence not specified [K40.20])    Surgeons: Jarad Carranza MD Anesthesiologist: Alton Webster MD    Anesthesia Type: general ASA Status: 2            Anesthesia Type: general    Vitals Value Taken Time   /70 12/06/24 1250   Temp 97.1 °F (36.2 °C) 12/06/24 1240   Pulse 84 12/06/24 1252   Resp 18 12/06/24 1252   SpO2 94 % 12/06/24 1252   Vitals shown include unfiled device data.    Patient Location: PACU    Anesthesia Type: general    Airway Patency: patent    Postop Pain Control: adequate    Mental Status: mildly sedated but able to meaningfully participate in the post-anesthesia evaluation    Nausea/Vomiting: none    Cardiopulmonary/Hydration status: stable euvolemic    Complications: no apparent anesthesia related complications    Postop vital signs: stable    Dental Exam: Unchanged from Preop    Patient to be discharged from PACU when criteria met.

## 2024-12-06 NOTE — OPERATIVE REPORT
[facility]  Operative Note    Isaac Perez Location: OR   [facility] [id number] MRN [id number]    1975 Age 49 year old   Admission Date 2024 Operation Date 2024   Attending Physician Jarad Carranza MD Operating Physician Jarad Carranza MD   PCP Reza Yost MD          Patient Name: Isaac Perez    Preoperative Diagnosis: Bilateral inguinal hernia without obstruction or gangrene, recurrence not specified [K40.20]    Postoperative Diagnosis: Same as pre-op diagnosis.    Primary Surgeon: Jarad Carranza MD     Assistant: CHARY Shin    Anesthesia: General    Anesthesiologist: Anesthesiologist.: Alton Webster MD    Procedures: Robotic lysis of adhesions and right inguinal hernia repair    Implants: Covidien Progrip Mesh 10 cm x 15 cm    Specimen: none     Drains: None    Estimated Blood Loss: 5 mL     Complications: None    Condition: Good    Indications for Surgery:   Isaac Perez is a 49 year old gentleman who presents with a painful enlarging bulge in the right groin. Physical exam right inguinal hernia. CT scan shows bilateral fat containing inguinal hernias. The patient presents today for elective hernia repair.    Surgical Findings:   Right indirect inguinal hernia  No left inguinal hernia noted on diagnostic laparoscopy    Description of Procedure:   The patient was transported to the operating room and placed on the operating table in supine position. General endotracheal anesthesia was administered. The abdomen and groins were clipped, prepped and draped in sterile fashion. Pre-operative antibiotics were given. A time-out was performed.      A transverse 8-mm supraumbilical incision was made. A Veress needle was inserted. Pneumoperitoneum was achieved to a pressure of 15mm Hg.  A 8mm trocar was placed and a laparoscope inserted. Diagnostic survey of the abdomen revealed no other acute pathology or iatrogenic injury. The inguinal region was examined and the hernia defect  was identified. Two 8-mm trocars were placed on either side of the abdomen in the midclavicular line under direct vision. The patient was placed in Trendelenburg position. The robot was then docked and instruments placed under direct vision.     The hernia contents were carefully reduced into the abdomen. The peritoneum was grasped, retracted, and incised approximately 5cm cephalad to the hernia defect. Blunt and sharp dissection were used with judicious electrocautery to create the pre-peritoneal pocket. Medial to lateral dissection was performed. We identifed initially the pubic tubercle and Ori's ligament. Dissection was carried out to a level posterior to the pubic tubercle and across the midline. The soft tissue was dissected medial to the iliac vein clear the femoral space. Then the soft tissues were dissected laterally to create a large pre-peritoneal pocket to accommodate a mesh. Care was taken not to injure the femoral vessels, the epigastric vessels, or any nerve structures during dissection.    Blunt and sharp dissection were used to dissect the hernia sac away from the spermatic cord. Care was taken to not injure the vas deferens or the spermatic vessels. The peritoneal flap was dissected back sufficiently so that retraction on the flap did not manipulate the spermatic cord structures. A cord lipoma was identified and dissected away from the spermatic cord. A Covidien ProGrip 10cm x 15cm mesh was placed in our dissected pocket and we ensured adequate coverage of all potential hernia sites. A 3-0 Vicryl suture was used to affix the medial aspect of the mesh to the pubic tubercle, and the superior aspect to the rectus abdominis muscle. The peritoneal flap was then reapproximated using running 2-0 V-Loc suture.     On the left side the sigmoid colon was adherent to the abdominal side wall in the area of expected internal inguinal ring. Using sharp scissors the sigmoid colon adhesions were lysed carefully  and meticulously to allow complete identification of the landmarks including the course of the iliac vessels and the inferior epigastric on the left. There was no signs of an inguinal hernia or any invagination of the peritoneum into the inguinal canal. This completed our diagnostics and no repair was attempted on the left side.    All instruments were removed and the robot undocked. 0.25% Marcaine with epinephrine was used to perform a ilioinguinal nerve block under direct laparoscopic visualization. All trocars were removed under direct visualization and pneumoperitoneum was released.  All wounds were cleansed and irrigated. The skin incisions were reapproximated using subcuticular 4-0 Monocryl suture. Local anesthetic was injected.  Skin glue was used to seal the incisions. The scrotum was palpated and the testicles were retracted back into their anatomic position.    The patient's drapes were removed and the patient was awakened from anesthesia. They were then transported to the recovery room in stable condition. The patient tolerated the procedure well without apparent complication. All needle, sponge, instrument counts correct at the end of procedure.      Jarad Carranza MD   12/6/2024  12:22 PM

## 2024-12-06 NOTE — H&P
New Patient Visit Note       Active Problems      No diagnosis found.      Chief Complaint   No chief complaint on file.      History of Present Illness   49 year old male who is here for evaluation of bilateral inguinal hernias. He was here previously however developed concerning chest pain. This was evaluated with complete lung and cardiac workup and was negative. He reports severe right groin pain. He works for the [employer] and his job requires significant amount of heavy lifting and movement. He reports pain is aggravated with lifting and walking. He reports some discomfort in the left groin too however this is less and mild compared to the right side. He denies nausea or vomiting or any other symptoms.       Allergies  Isaac is allergic to vancomycin.    Past Medical / Surgical / Social / Family History    The past medical and past surgical history have been reviewed by me today.    Past Medical History:    Abdominal pain    On and off not consistent    Allergic rhinitis    Anxiety    Comes and goes being treated for it.    Arthritis    Back pain    Surgery in 2006    Black stools    Only time    Bloating    Nothing consistent    Blood in the stool    Only time    Blurred vision    legally blind in right eye    Cancer (HCC)    right shoulder melanoma    Chronic cough    for 3 weeks    Decorative tattoo    Left upper arm    Depression    Diarrhea, unspecified    Once every few months    Diverticulosis of large intestine    Esophageal reflux    Heartburn    On and off not consistent    Hemorrhoids    Havent had in while    History of depression    Last few years    History of mental disorder    Aniexty, Panic attacks    Indigestion    On and off not consistent    Osteoarthritis    right hip    Pain with bowel movements    Once every few months brings on cold sweat    Painful swallowing    When I eat too fast it occurs    Sleep apnea    \"mild\" case- no device    Sleep disturbance    Stress    This year hasnt  helped    Visual impairment    right eye partial blind    Wears glasses    Glasses    Wheezing    for 3 weeks     Past Surgical History:   Procedure Laterality Date    Back surgery  2006    L5, S1    Colonoscopy  2007    Colonoscopy      Other surgical history  2020    Left ulnar nerve surgery    Other surgical history      excision right shoulder melanoma    Skin surgery  10/02/2017    Excision of a Cyst - L mid back    Spine surgery procedure unlisted  2006    L5 S1 fusion and disc replacement    Upper gi endoscopy,exam         The family history and social history have been reviewed by me today.    Family History   Problem Relation Age of Onset    Prostate Cancer Father             Mental Disorder Father     Ovarian Cancer Maternal Grandmother          in     Heart Attack Maternal Grandfather     Heart Attack Paternal Grandmother     Mental Disorder Paternal Grandmother     Heart Attack Paternal Grandfather     Mental Disorder Brother      Social History     Socioeconomic History    Marital status:    Tobacco Use    Smoking status: Never    Smokeless tobacco: Never    Tobacco comments:     Handful on cigars over the years   Vaping Use    Vaping status: Never Used   Substance and Sexual Activity    Alcohol use: Not Currently     Comment: Not with the medication I'm on.    Drug use: Never      No current outpatient medications on file.      Review of Systems  The Review of Systems has been reviewed by me during today.  Review of Systems   Constitutional:  Negative for chills, diaphoresis, fatigue and fever.   HENT:  Negative for ear discharge, ear pain and sore throat.    Eyes:  Negative for pain and discharge.   Respiratory:  Negative for cough, chest tightness and shortness of breath.    Cardiovascular:  Negative for chest pain, palpitations and leg swelling.   Gastrointestinal:  Negative for abdominal distention, abdominal pain, blood in stool, constipation, diarrhea, nausea and  vomiting.   Genitourinary:  Negative for dysuria, frequency, hematuria and urgency.   Skin:  Negative for color change, pallor and rash.   Neurological:  Negative for weakness, light-headedness, numbness and headaches.   Hematological:  Negative for adenopathy. Does not bruise/bleed easily.   Psychiatric/Behavioral:  Negative for agitation and confusion.        Physical Findings     Physical Exam  Abdominal:      General: Abdomen is flat. There is no distension.      Palpations: Abdomen is soft.      Comments: Bilateral tenderness in right and left groin, small inguinal  hernias bilaterally and easily reducible             Assessment/Plan  No diagnosis found.      Isaac Perez is a 49 year old male referred by Reza Yost MD for evaluation of inguinal hernias. The right side is more symptomatic than the left. I reviewed his MRI and his CT scan of the abdomen and pelvis and noted the bilateral inguinal hernias containing fat. I discussed with the patient the treatment options. He would like to undergo surgical repair. I recommend proceeding with robotic right and possible left inguinal hernia repair. The risks, benefits, and alternatives of surgical intervention were explained to the patient in detail including, but not limited to, bleeding, infection, recurrence, nondiagnostic yield, incorrect diagnosis, prolonged postoperative pain, urinary retention, injury to adjacent organs and structures, injury to the ilioinguinal and iliohypogastric nerve, testicular ischemia leading to ischemic orchitis or testicular atrophy, injury to the vas deferens, as well as potential need for further therapeutic, diagnostic or surgical intervention. The patient voiced understanding. All  pertinent questions were answered to the patient's satisfaction, after whichwilling and informed consent to proceed with surgery was obtained.  .           Jarad Carranza MD

## 2024-12-06 NOTE — ANESTHESIA PREPROCEDURE EVALUATION
PRE-OP EVALUATION    Patient Name: Isaac Perez    Admit Diagnosis: Bilateral inguinal hernia without obstruction or gangrene, recurrence not specified [K40.20]    Pre-op Diagnosis: Bilateral inguinal hernia without obstruction or gangrene, recurrence not specified [K40.20]    XI ROBOT-ASSISTED LAPAROSCOPIC INGUINAL HERNIA REPAIR- BILATERAL    Anesthesia Procedure: XI ROBOT-ASSISTED LAPAROSCOPIC INGUINAL HERNIA REPAIR- BILATERAL (Bilateral)    Surgeons and Role:     * Jarad Carranza MD - Primary    Pre-op vitals reviewed.  Resp: 16  BP: 120/88  SpO2: 97 %  Body mass index is 35.12 kg/m².    Current medications reviewed.  Hospital Medications:  • [Transfer Hold] acetaminophen (Tylenol Extra Strength) tab 1,000 mg  1,000 mg Oral Once   • [Transfer Hold] scopolamine (Transderm-Scop) 1 MG/3DAYS patch 1 patch  1 patch Transdermal Once   • lactated ringers infusion   Intravenous Continuous   • [COMPLETED] heparin (Porcine) 5000 UNIT/ML injection 5,000 Units  5,000 Units Subcutaneous Once   • ceFAZolin (Ancef) 2g in 10mL IV syringe premix  2 g Intravenous Once   • ceFAZolin (Ancef) 2 g/10mL IV syringe premix           Outpatient Medications:   Prescriptions Prior to Admission[1]    Allergies: Vancomycin      Anesthesia Evaluation    Patient summary reviewed.    Anesthetic Complications  (-) history of anesthetic complications         GI/Hepatic/Renal      (+) GERD                           Cardiovascular                                                       Endo/Other    Negative endo/other ROS.                              Pulmonary                           Neuro/Psych      (+) depression                            Past Surgical History:   Procedure Laterality Date   • Back surgery  02/2006    L5, S1   • Colonoscopy  12/2007   • Colonoscopy     • Other surgical history  Feb 2020    Left ulnar nerve surgery   • Other surgical history      excision right shoulder melanoma   • Skin surgery  10/02/2017    Excision of a  Cyst - L mid back   • Spine surgery procedure unlisted  02/2006    L5 S1 fusion and disc replacement   • Upper gi endoscopy,exam       Social History     Socioeconomic History   • Marital status:    Tobacco Use   • Smoking status: Never   • Smokeless tobacco: Never   • Tobacco comments:     Handful on cigars over the years   Vaping Use   • Vaping status: Never Used   Substance and Sexual Activity   • Alcohol use: Not Currently     Comment: Not with the medication I'm on.   • Drug use: Never     History   Drug Use Unknown     Available pre-op labs reviewed.  Lab Results   Component Value Date    WBC 9.1 10/16/2024    RBC 5.14 10/16/2024    HGB 16.1 10/16/2024    HCT 45.3 10/16/2024    MCV 88.1 10/16/2024    MCH 31.3 10/16/2024    MCHC 35.5 10/16/2024    RDW 12.7 10/16/2024    .0 10/16/2024     Lab Results   Component Value Date     10/16/2024    K 4.0 10/16/2024     10/16/2024    CO2 27.0 10/16/2024    BUN 13 10/16/2024    CREATSERUM 1.00 10/16/2024     (H) 10/16/2024    CA 9.7 10/16/2024            Airway      Mallampati: II  Mouth opening: 3 FB  TM distance: 4 - 6 cm  Neck ROM: full Cardiovascular    Cardiovascular exam normal.         Dental  Comment: Normal wear/minor imperfections and discoloration noted. No grossly weakened or damaged teeth on exam.           Pulmonary    Pulmonary exam normal.                 Other findings        ASA: 2   Plan: general  NPO status verified and patient meets guidelines.        Comment: We discussed general anesthesia as the primary form of anesthesia for this procedure. General anesthesia involves the use a breathing tube to help the patient breathe and deliver anesthetic gasses. Several intravenous medications will be used to help keep the patient safe and comfortable.  Common risks with general anesthesia include nausea, vomiting, scratched eye, sore throat and dental damage. The risk of dental damage is higher for weakened or damaged teeth.  Rare but serious risks can occur. Some of these serious complications include brain injury, nerve injury, blindness, and death. Risks of these serious injury are small, but never zero. I asked the patient if they had any questions about the consent form and what was written on it. The patient was given the opportunity to ask questions in the pre op area and in the operating room before going to sleep. All questions were answered.                Present on Admission:  **None**             [1]   Medications Prior to Admission   Medication Sig Dispense Refill Last Dose/Taking   • clonazePAM 0.5 MG Oral Tab Take 1 tablet (0.5 mg total) by mouth 2 (two) times daily.   12/6/2024 Morning   • acetaminophen-codeine 300-30 MG Oral Tab TAKE 1 TABLET BY MOUTH EVERY 6 HOURS FOR 5 DAYS AS NEEDED   12/6/2024 at  6:15 AM   • Omeprazole 40 MG Oral Capsule Delayed Release Take 1 capsule (40 mg total) by mouth 2 (two) times daily before meals. 30 min prior to meals 180 capsule 3 12/5/2024   • FLUoxetine 20 MG Oral Cap TAKE ONE CAPSULE BY MOUTH ONCE EVERY MORNING WITH 40MG   12/6/2024 Morning   • ascorbic acid, VITAMIN C, 250 MG Oral Tab Take 1 tablet (250 mg total) by mouth daily.   Past Week   • traZODone HCl 150 MG Oral Tab Take 1 tablet (150 mg total) by mouth nightly.   Taking   • Zinc 50 MG Oral Cap    Past Week   • FLUoxetine HCl 40 MG Oral Cap Take 1 capsule (40 mg total) by mouth daily.   Taking   • LORazepam 0.5 MG Oral Tab as needed.     Taking As Needed   • Probiotic Product (PROBIOTIC-10 OR) Take by mouth.   Taking   • Multiple Vitamins-Minerals (MULTIVITAMIN ADULT EXTRA C OR) Take by mouth.   Past Week

## 2024-12-09 ENCOUNTER — TELEPHONE (OUTPATIENT)
Dept: ORTHOPEDICS CLINIC | Facility: CLINIC | Age: 49
End: 2024-12-09

## 2024-12-09 NOTE — TELEPHONE ENCOUNTER
NP to Dr. Alston, referred by Dr. White for R hip injections.    Xray done on 11/11/24, MRI done on 11/22/24.    Please advise.    Future Appointments   Date Time Provider Department Center   12/20/2024  8:40 AM Jeff Alston, DO [facility] [address]   2/28/2025  7:00 AM Danilo Garnett MD [facility]   2/28/2025  7:30 AM Danilo Garnett MD [facility]

## 2024-12-20 ENCOUNTER — OFFICE VISIT (OUTPATIENT)
Facility: CLINIC | Age: 49
End: 2024-12-20
Payer: COMMERCIAL

## 2024-12-20 VITALS — BODY MASS INDEX: 35.01 KG/M2 | WEIGHT: 231 LBS | HEIGHT: 68 IN

## 2024-12-20 DIAGNOSIS — M65.951 TENOSYNOVITIS OF RIGHT HIP: Primary | ICD-10-CM

## 2024-12-20 DIAGNOSIS — S76.011A TEAR OF RIGHT GLUTEUS MINIMUS TENDON, INITIAL ENCOUNTER: ICD-10-CM

## 2024-12-20 DIAGNOSIS — M25.551 PAIN OF RIGHT HIP: ICD-10-CM

## 2024-12-20 DIAGNOSIS — S76.011A TEAR OF RIGHT GLUTEUS MEDIUS TENDON, INITIAL ENCOUNTER: ICD-10-CM

## 2024-12-20 DIAGNOSIS — M25.551 GREATER TROCHANTERIC PAIN SYNDROME OF RIGHT LOWER EXTREMITY: ICD-10-CM

## 2024-12-20 RX ORDER — KETOROLAC TROMETHAMINE 30 MG/ML
30 INJECTION, SOLUTION INTRAMUSCULAR; INTRAVENOUS ONCE
Status: COMPLETED | OUTPATIENT
Start: 2024-12-20 | End: 2024-12-20

## 2024-12-20 RX ORDER — TRIAMCINOLONE ACETONIDE 40 MG/ML
40 INJECTION, SUSPENSION INTRA-ARTICULAR; INTRAMUSCULAR ONCE
Status: COMPLETED | OUTPATIENT
Start: 2024-12-20 | End: 2024-12-20

## 2024-12-20 RX ADMIN — TRIAMCINOLONE ACETONIDE 40 MG: 40 INJECTION, SUSPENSION INTRA-ARTICULAR; INTRAMUSCULAR at 09:00:00

## 2024-12-20 RX ADMIN — KETOROLAC TROMETHAMINE 30 MG: 30 INJECTION, SOLUTION INTRAMUSCULAR; INTRAVENOUS at 09:00:00

## 2024-12-20 NOTE — H&P
Sports Medicine Clinic Note    Subjective:    Chief Complaint: Right hip pain.    History: 49-year-old male with a history of intermittent right hip pain that began after a hike when he felt a pop July 2024. Symptoms include sharp pain located in the groin, lower back, and lateral aspect of the hip. The pain is reported as 6/10 in severity, affecting sleep, ambulation, and daily activities. Patient denies prior intraarticular injections but has undergone lumbar epidural injections and L5-S1 fusion in 2006. Recently diagnosed with an inguinal hernia, surgery completed and he is in recovery. There is question of a peripheral pain generator trochanteric versus articular/periarticular or combination therein. He was referred for US guided injections for therapeutic/diagnostic purposes to help delineate this.    Objective:    Right Hip Examination:    Inspection: Ambulates with a normal gait.  Range of Motion: Flexion limited to 0-85° with pain at terminal forced flexion. Internal rotation 10°, external rotation 45°.  Palpation: Trochanteric tenderness noted.  Neurovascular: Sensation intact to light touch. Dorsalis pedis and posterior tibial pulses palpable with brisk capillary refill.  Special Tests: Positive Stinchfield and trochanteric pain sign. Equivocal DONAVAN and FADIR tests.    Diagnostic Tests:    MRI HIPS, RIGHT (11/22/24):  Labral tear in the anterosuperior quadrant of the acetabular labrum.  Low-grade partial-thickness tearing of the gluteus minimus tendon and high-grade partial-thickness tearing of the anterior third of the gluteus medius tendon at the trochanteric insertion.  Moderate tendinopathy in both tendons.  Elevated alpha angle (55°) consistent with CAM-type femoral acetabular impingement.  XR HIP W OR WO PELVIS, RIGHT (11/11/24):  Mild joint space narrowing with small osteophyte formation indicating mild osteoarthritis.    Assessment:    High-grade partial-thickness tear of the gluteus medius tendon  with associated tendinopathy.  Mild right hip osteoarthritis.  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    Plan:    Procedures: Ultrasound-guided gluteal tendon insertion tenosynovial sheath injection performed.  Medications: NSAIDs as needed for pain management.  Activity Recommendations: Avoid prolonged walking, running, or repetitive hip flexion activities for the next week.    Follow-Up: Tentatively scheduled for 2-4 weeks to evaluate the response to the injection. If symptoms persist or worsen, consider an intraarticular hip joint injection for additional diagnostic and therapeutic benefit.    Ultrasound Guided Procedure Note:    After discussion of the risks and benefits, the patient elected to proceed with an ultrasound guided injection into the gluteal tenosynovial sheath near its insertion on the greater trochanter, right side. Confirmed that the patient does not have history of prior adverse reactions, active infections, or relevant allergies. There was no erythema or warmth, and the skin was clear.     The skin was sterilized with ChloraPrep. A 22 gauge needle was inserted via inferolateral approach utilizing US for needle guidance and placement. The site was injected with a mixture of 1 mL of triamcinolone 40 mg/mL, 1 mL of ketorolac 30 mg/mL and 1 mL of 1% Lidocaine without Epinephrine. The injection was completed without complication, and a bandage was applied. The patient tolerated the procedure well and was instructed to avoid strenuous activity for the next 24-48 hours and to use ice or Tylenol for pain as needed. The patient will call immediately with any signs of infection or allergic reaction.    Post-Injection Care: The patient tolerated the procedure well. An occlusive bandage was placed over the injection site. Post-injection care instructions provided to the patient. The patient was asked to avoid strenuous activity and continue to rest the area for 2-3 days  before resuming regular activities. Patient advised that the area may be more painful for the first 1-2 days. They can use ice or Tylenol for pain as needed.  Patient was instructed to watch for fever, increased swelling, or persistent pain. The patient will call immediately with any signs of infection or allergic reaction.      Jeff Alston DO, CAM   [facility] Sports [facility]

## 2025-01-03 ENCOUNTER — OFFICE VISIT (OUTPATIENT)
Facility: LOCATION | Age: 50
End: 2025-01-03
Payer: COMMERCIAL

## 2025-01-03 VITALS
SYSTOLIC BLOOD PRESSURE: 152 MMHG | OXYGEN SATURATION: 96 % | HEART RATE: 102 BPM | TEMPERATURE: 99 F | DIASTOLIC BLOOD PRESSURE: 83 MMHG | RESPIRATION RATE: 18 BRPM

## 2025-01-03 DIAGNOSIS — Z98.890 POST-OPERATIVE STATE: Primary | ICD-10-CM

## 2025-01-03 PROCEDURE — 3079F DIAST BP 80-89 MM HG: CPT | Performed by: PHYSICIAN ASSISTANT

## 2025-01-03 PROCEDURE — 99024 POSTOP FOLLOW-UP VISIT: CPT | Performed by: PHYSICIAN ASSISTANT

## 2025-01-03 PROCEDURE — 3077F SYST BP >= 140 MM HG: CPT | Performed by: PHYSICIAN ASSISTANT

## 2025-01-03 NOTE — PROGRESS NOTES
Post Operative Visit Note       Active Problems  1. Post-operative state         Chief Complaint   Chief Complaint   Patient presents with    Post-Op     PO 12/6/24 XI ROBOT-ASSISTED LAPAROSCOPIC right HERNIA REPAIR w/[facility]           History of Present Illness   49 year old male who presents today for postoperative visit following robotic right inguinal hernia repair with mesh on 12/6/2024 by Dr. Carranza.    The patient states that he is continue to experience right groin pain that is more severe with activity and movement.  He denies nausea or vomiting.  He denies diarrhea or constipation.  He denies difficulty urinating.          Allergies  Isaac is allergic to vancomycin.    Past Medical / Surgical / Social / Family History    The past medical and past surgical history have been reviewed by me today.     Past Medical History:    Abdominal pain    On and off not consistent    Allergic rhinitis    Anxiety    Comes and goes being treated for it.    Arthritis    Back pain    Surgery in 2006    Black stools    Only time    Bloating    Nothing consistent    Blood in the stool    Only time    Blurred vision    legally blind in right eye    Cancer (HCC)    right shoulder melanoma    Chronic cough    for 3 weeks    Decorative tattoo    Left upper arm    Depression    Diarrhea, unspecified    Once every few months    Diverticulosis of large intestine    Esophageal reflux    Heartburn    On and off not consistent    Hemorrhoids    Havent had in while    History of depression    Last few years    History of mental disorder    Aniexty, Panic attacks    Indigestion    On and off not consistent    Osteoarthritis    right hip    Pain with bowel movements    Once every few months brings on cold sweat    Painful swallowing    When I eat too fast it occurs    Sleep apnea    \"mild\" case- no device    Sleep disturbance    Stress    This year hasnt helped    Visual impairment    right eye partial blind    Wears glasses     Glasses    Wheezing    for 3 weeks     Past Surgical History:   Procedure Laterality Date    Back surgery  2006    L5, S1    Colonoscopy  2007    Colonoscopy      Hernia surgery  2024    XI ROBOT-ASSISTED LAPAROSCOPIC right HERNIA REPAIR    Other surgical history  2020    Left ulnar nerve surgery    Other surgical history      excision right shoulder melanoma    Skin surgery  10/02/2017    Excision of a Cyst - L mid back    Spine surgery procedure unlisted  2006    L5 S1 fusion and disc replacement    Upper gi endoscopy,exam         The family history and social history have been reviewed by me today.    Family History   Problem Relation Age of Onset    Prostate Cancer Father             Mental Disorder Father     Ovarian Cancer Maternal Grandmother          in     Heart Attack Maternal Grandfather     Heart Attack Paternal Grandmother     Mental Disorder Paternal Grandmother     Heart Attack Paternal Grandfather     Mental Disorder Brother      Social History     Socioeconomic History    Marital status:    Tobacco Use    Smoking status: Never    Smokeless tobacco: Never    Tobacco comments:     Handful on cigars over the years   Vaping Use    Vaping status: Never Used   Substance and Sexual Activity    Alcohol use: Not Currently     Comment: Not with the medication I'm on.    Drug use: Never        Current Outpatient Medications:     oxyCODONE 5 MG Oral Tab, Take 1 tablet (5 mg total) by mouth every 4 (four) hours as needed., Disp: 15 tablet, Rfl: 0    clonazePAM 0.5 MG Oral Tab, Take 1 tablet (0.5 mg total) by mouth 2 (two) times daily., Disp: , Rfl:     acetaminophen-codeine 300-30 MG Oral Tab, TAKE 1 TABLET BY MOUTH EVERY 6 HOURS FOR 5 DAYS AS NEEDED, Disp: , Rfl:     Omeprazole 40 MG Oral Capsule Delayed Release, Take 1 capsule (40 mg total) by mouth 2 (two) times daily before meals. 30 min prior to meals, Disp: 180 capsule, Rfl: 3    FLUoxetine 20 MG Oral Cap, TAKE ONE  CAPSULE BY MOUTH ONCE EVERY MORNING WITH 40MG, Disp: , Rfl:     ascorbic acid, VITAMIN C, 250 MG Oral Tab, Take 1 tablet (250 mg total) by mouth daily., Disp: , Rfl:     traZODone HCl 150 MG Oral Tab, Take 1 tablet (150 mg total) by mouth nightly., Disp: , Rfl:     Zinc 50 MG Oral Cap, , Disp: , Rfl:     FLUoxetine HCl 40 MG Oral Cap, Take 1 capsule (40 mg total) by mouth daily., Disp: , Rfl:     LORazepam 0.5 MG Oral Tab, as needed.  , Disp: , Rfl:     Probiotic Product (PROBIOTIC-10 OR), Take by mouth., Disp: , Rfl:     Multiple Vitamins-Minerals (MULTIVITAMIN ADULT EXTRA C OR), Take by mouth., Disp: , Rfl:       Review of Systems  A 10 point Review of Systems has been completed by me today and is negative except as above in the HPI.    Physical Findings   /83   Pulse 102   Temp 98.6 °F (37 °C) (Temporal)   Resp 18   SpO2 96%   Gen/psych: alert and oriented, cooperative, no apparent distress  Cardiovascular: regular rate  Respiratory: respirations unlabored, no wheeze  Abdominal: soft, non-tender, non-distended, no guarding/rebound  Incisions: Incisions have healed well.  There is no redness or drainage noted.         Assessment/Plan  1. Post-operative state        The patient is overall doing well following robotic assisted right inguinal hernia repair with mesh.  The patient is to continue with diet as tolerated.  The patient is to continue with lifting restrictions of no more than 20 pounds for 6 weeks from the date of his surgery.  I discussed with the patient that he is still with in the postoperative period and I do expect him to experience pain with tenderness and movement.  This will continue to improve over the next month or so.  The patient is to return to work on 1/20/2025 without restrictions.  All questions or concerns were answered.  The patient may return to the clinic as needed.        No orders of the defined types were placed in this encounter.       Imaging & Referrals   None    Follow  Up  Return if symptoms worsen or fail to improve.    Yu Richards PA-C  [facility]  1/3/2025  2:04 PM

## 2025-01-10 ENCOUNTER — OFFICE VISIT (OUTPATIENT)
Facility: CLINIC | Age: 50
End: 2025-01-10
Payer: COMMERCIAL

## 2025-01-10 VITALS — WEIGHT: 231 LBS | HEIGHT: 68 IN | BODY MASS INDEX: 35.01 KG/M2

## 2025-01-10 DIAGNOSIS — S73.191D TEAR OF RIGHT ACETABULAR LABRUM, SUBSEQUENT ENCOUNTER: ICD-10-CM

## 2025-01-10 DIAGNOSIS — M25.551 GREATER TROCHANTERIC PAIN SYNDROME OF RIGHT LOWER EXTREMITY: ICD-10-CM

## 2025-01-10 DIAGNOSIS — S76.011D TEAR OF RIGHT GLUTEUS MINIMUS TENDON, SUBSEQUENT ENCOUNTER: ICD-10-CM

## 2025-01-10 DIAGNOSIS — S76.011D TEAR OF RIGHT GLUTEUS MEDIUS TENDON, SUBSEQUENT ENCOUNTER: ICD-10-CM

## 2025-01-10 DIAGNOSIS — M65.951 TENOSYNOVITIS OF RIGHT HIP: ICD-10-CM

## 2025-01-10 DIAGNOSIS — M25.851 HIP IMPINGEMENT SYNDROME, RIGHT: Primary | ICD-10-CM

## 2025-01-10 NOTE — PROGRESS NOTES
Sports Medicine Clinic Note    Subjective:    Chief Complaint: Persistent right hip pain.    History: 49-year-old male presenting for follow-up regarding ongoing right hip pain. The patient reports no improvement in symptoms since the last visit and the prior ultrasound-guided gluteal tendon sheath injection. He continues to experience sharp pain in the groin, lower back, and lateral hip, rated as 6/10 in severity, which disrupts sleep, ambulation, and daily activities. He denies any new injuries, fevers, redness, or swelling. Patient states he would like to proceed today with intraarticular hip injection for diagnostic and therapeutic purposes as discussed previously.    Objective:    Right Hip Examination:    Inspection: Ambulates with a normal gait. No erythema or swelling observed.  Range of Motion: Flexion limited to 0-85° with pain at terminal forced flexion. Internal rotation 10°, external rotation 45°.  Palpation: Persistent tenderness over the greater trochanter.  Neurovascular: Sensation intact to light touch. Dorsalis pedis and posterior tibial pulses palpable with brisk capillary refill.  Special Tests: Positive Stinchfield and trochanteric pain sign. Equivocal DONAVAN and FADIR tests.    Diagnostic Tests:    No new imaging since the prior visit.  MRI HIPS, RIGHT (11/22/24): Labral tear in the anterosuperior quadrant of the acetabular labrum. Low-grade partial-thickness tearing of the gluteus minimus tendon and high-grade partial-thickness tearing of the anterior third of the gluteus medius tendon at the trochanteric insertion. Moderate tendinopathy in both tendons. Elevated alpha angle (55°) consistent with CAM-type femoral acetabular impingement.  XR HIP W OR WO PELVIS, RIGHT (11/11/24): Mild joint space narrowing with small osteophyte formation indicating mild osteoarthritis.    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  Persistent high-grade partial-thickness tear  of the gluteus medius tendon with associated tendinopathy.  Mild right hip osteoarthritis.    Plan:    Procedure: Perform ultrasound-guided right intraarticular hip injection today for diagnostic and therapeutic purposes.  Medications: Continue NSAIDs as needed for pain control.  Activity Recommendations: Avoid high-impact activities, prolonged walking, or repetitive hip flexion for the next 1-2 weeks.    Follow-Up: Tentatively scheduled in 3-4 weeks to reassess response to today’s injection and further evaluate treatment options. Adjust plan accordingly based on symptomatic improvement or lack thereof.    Ultrasound Guided Procedure Note:    After discussion of the risks and benefits, the patient elected to proceed with an ultrasound guided injection into the femoroacetabular space, right side. Confirmed that the patient does not have history of prior adverse reactions, active infections, or relevant allergies. There was no erythema or warmth, and the skin was clear.     The skin was sterilized with ChloraPrep. A 22 gauge needle was inserted via inferolateral approach utilizing US for needle guidance and placement. The site was injected with a mixture of 1 mL of triamcinolone 40 mg/mL and 2 mL of 1% Lidocaine without Epinephrine. The injection was completed without complication, and a bandage was applied. The patient tolerated the procedure well and was instructed to avoid strenuous activity for the next 24-48 hours and to use ice or Tylenol for pain as needed. The patient will call immediately with any signs of infection or allergic reaction.    Post-Injection Care: The patient tolerated the procedure well. An occlusive bandage was placed over the injection site. Post-injection care instructions provided to the patient. The patient was asked to avoid strenuous activity and continue to rest the area for 2-3 days before resuming regular activities. Patient advised that the area may be more painful for the first 1-2  days. They can use ice or Tylenol for pain as needed.  Patient was instructed to watch for fever, increased swelling, or persistent pain. The patient will call immediately with any signs of infection or allergic reaction.      Jeff Alston DO, CAM   Primary Care Sports Medicine

## 2025-01-13 RX ORDER — TRIAMCINOLONE ACETONIDE 40 MG/ML
40 INJECTION, SUSPENSION INTRA-ARTICULAR; INTRAMUSCULAR ONCE
Status: COMPLETED | OUTPATIENT
Start: 2025-01-13 | End: 2025-01-13

## 2025-01-13 RX ADMIN — TRIAMCINOLONE ACETONIDE 40 MG: 40 INJECTION, SUSPENSION INTRA-ARTICULAR; INTRAMUSCULAR at 09:24:00

## 2025-02-14 ENCOUNTER — TELEPHONE (OUTPATIENT)
Facility: CLINIC | Age: 50
End: 2025-02-14

## 2025-02-14 DIAGNOSIS — M54.50 LOW BACK PAIN, UNSPECIFIED BACK PAIN LATERALITY, UNSPECIFIED CHRONICITY, UNSPECIFIED WHETHER SCIATICA PRESENT: Primary | ICD-10-CM

## 2025-02-14 NOTE — TELEPHONE ENCOUNTER
Patient called to schedule for lower back pain and spasms. Please advise if imaging is needed.   Future Appointments   Date Time Provider Department Center   2/17/2025  8:40 AM Venu Frost MD [facility]   2/28/2025  7:00 AM Danilo Garnett MD [facility] SUB GI   2/28/2025  7:30 AM Danilo Garnett MD [facility] SUB GI     Patient already advised to arrive 15 min early for Xray

## 2025-02-17 ENCOUNTER — HOSPITAL ENCOUNTER (OUTPATIENT)
Dept: GENERAL RADIOLOGY | Age: 50
Discharge: HOME | End: 2025-02-17
Attending: STUDENT IN AN ORGANIZED HEALTH CARE EDUCATION/TRAINING PROGRAM
Payer: COMMERCIAL

## 2025-02-17 ENCOUNTER — OFFICE VISIT (OUTPATIENT)
Dept: ORTHOPEDICS CLINIC | Facility: CLINIC | Age: 50
End: 2025-02-17
Payer: COMMERCIAL

## 2025-02-17 VITALS — BODY MASS INDEX: 35.61 KG/M2 | WEIGHT: 235 LBS | HEIGHT: 68 IN

## 2025-02-17 DIAGNOSIS — G89.29 CHRONIC RIGHT-SIDED LOW BACK PAIN WITHOUT SCIATICA: Primary | ICD-10-CM

## 2025-02-17 DIAGNOSIS — M54.50 LOW BACK PAIN, UNSPECIFIED BACK PAIN LATERALITY, UNSPECIFIED CHRONICITY, UNSPECIFIED WHETHER SCIATICA PRESENT: ICD-10-CM

## 2025-02-17 DIAGNOSIS — M54.50 CHRONIC RIGHT-SIDED LOW BACK PAIN WITHOUT SCIATICA: Primary | ICD-10-CM

## 2025-02-17 PROCEDURE — 72100 X-RAY EXAM L-S SPINE 2/3 VWS: CPT | Performed by: STUDENT IN AN ORGANIZED HEALTH CARE EDUCATION/TRAINING PROGRAM

## 2025-02-17 RX ORDER — MELOXICAM 15 MG/1
15 TABLET ORAL DAILY
Qty: 14 TABLET | Refills: 0 | Status: SHIPPED | OUTPATIENT
Start: 2025-02-17

## 2025-02-17 NOTE — H&P
[facility] - ORTHOPEDICS  [address] [place] [address], [address] [address]  [address] [place] [address]  [phone]     NEW PATIENT VISIT - HISTORY AND PHYSICAL EXAMINATION     Name: Isaac Perez   MRN: [id number]  Date: 02/17/25       CC: low back pain    REFERRED BY: Reza Yost MD    HPI:   Isaac Perez is a very pleasant 49 year old male who presents today for evaluation of back pain. The distribution of symptoms are: 90% backpain and 10% leg pain. The symptoms began many year(s) ago without any significant injury. Since the onset, the symptoms have wax and waned over time. Patient feels pain is aggravated by activity and improved by rest. The patient reports no numbness and no weakness.  The symptom characteristics are as follows: Patient is a 49-year-old male presenting with predominantly right-sided low back pain with intermittent sciatica symptoms.  Patient has history of L5-S1 fusion 20 years ago.  No evidence of complications from surgery.  Symptoms have waxed and waned over the years.     Prior spine surgery: none.    Bowel and bladder symptoms: absent.    The patient has not had issues with balance and/or hand dexterity problems such as changes in penmanship or the use of buttons or zippers.    Treatment up to this time has included:    Evaluation: PCP  NSAIDS: have worked well  Narcotic use: None  Physical therapy: multiple times previously  Spinal injections: previously  Others:       PMH:   Past Medical History:    Abdominal pain    On and off not consistent    Allergic rhinitis    Anxiety    Comes and goes being treated for it.    Arthritis    Back pain    Surgery in 2006    Black stools    Only time    Bloating    Nothing consistent    Blood in the stool    Only time    Blurred vision    legally blind in right eye    Cancer (HCC)    right shoulder melanoma    Chronic cough    for 3 weeks    Decorative tattoo    Left upper arm    Depression     Diarrhea, unspecified    Once every few months    Diverticulosis of large intestine    Esophageal reflux    Heartburn    On and off not consistent    Hemorrhoids    Havent had in while    History of depression    Last few years    History of mental disorder    Aniexty, Panic attacks    Indigestion    On and off not consistent    Osteoarthritis    right hip    Pain with bowel movements    Once every few months brings on cold sweat    Painful swallowing    When I eat too fast it occurs    Sleep apnea    \"mild\" case- no device    Sleep disturbance    Stress    This year hasnt helped    Visual impairment    right eye partial blind    Wears glasses    Glasses    Wheezing    for 3 weeks       PAST SURGICAL HX:  Past Surgical History:   Procedure Laterality Date    Back surgery  2006    L5, S1    Colonoscopy  2007    Colonoscopy      Hernia surgery  2024    XI ROBOT-ASSISTED LAPAROSCOPIC right HERNIA REPAIR    Other surgical history  2020    Left ulnar nerve surgery    Other surgical history      excision right shoulder melanoma    Skin surgery  10/02/2017    Excision of a Cyst - L mid back    Spine surgery procedure unlisted  2006    L5 S1 fusion and disc replacement    Upper gi endoscopy,exam         FAMILY HX:  Family History   Problem Relation Age of Onset    Prostate Cancer Father             Mental Disorder Father     Ovarian Cancer Maternal Grandmother          in     Heart Attack Maternal Grandfather     Heart Attack Paternal Grandmother     Mental Disorder Paternal Grandmother     Heart Attack Paternal Grandfather     Mental Disorder Brother        ALLERGIES:  Vancomycin    MEDICATIONS:   Current Outpatient Medications   Medication Sig Dispense Refill    Meloxicam 15 MG Oral Tab Take 1 tablet (15 mg total) by mouth daily. 14 tablet 0    oxyCODONE 5 MG Oral Tab Take 1 tablet (5 mg total) by mouth every 4 (four) hours as needed. 15 tablet 0    clonazePAM 0.5 MG Oral Tab Take 1 tablet  (0.5 mg total) by mouth 2 (two) times daily.      acetaminophen-codeine 300-30 MG Oral Tab TAKE 1 TABLET BY MOUTH EVERY 6 HOURS FOR 5 DAYS AS NEEDED      Omeprazole 40 MG Oral Capsule Delayed Release Take 1 capsule (40 mg total) by mouth 2 (two) times daily before meals. 30 min prior to meals 180 capsule 3    FLUoxetine 20 MG Oral Cap TAKE ONE CAPSULE BY MOUTH ONCE EVERY MORNING WITH 40MG      ascorbic acid, VITAMIN C, 250 MG Oral Tab Take 1 tablet (250 mg total) by mouth daily.      traZODone HCl 150 MG Oral Tab Take 1 tablet (150 mg total) by mouth nightly.      Zinc 50 MG Oral Cap       FLUoxetine HCl 40 MG Oral Cap Take 1 capsule (40 mg total) by mouth daily.      LORazepam 0.5 MG Oral Tab as needed.        Probiotic Product (PROBIOTIC-10 OR) Take by mouth.      Multiple Vitamins-Minerals (MULTIVITAMIN ADULT EXTRA C OR) Take by mouth.         ROS: A comprehensive 14 point review of systems was performed and was negative aside from the aforementioned per history of present illness.    SOCIAL HX:  Social History     Tobacco Use    Smoking status: Never    Smokeless tobacco: Never    Tobacco comments:     Handful on cigars over the years   Substance Use Topics    Alcohol use: Not Currently     Comment: Not with the medication I'm on.         PE:   Vitals:    02/17/25 0832   Weight: 235 lb (106.6 kg)   Height: 5' 8\" (1.727 m)     Estimated body mass index is 35.73 kg/m² as calculated from the following:    Height as of this encounter: 5' 8\" (1.727 m).    Weight as of this encounter: 235 lb (106.6 kg).    Physical Exam  Constitutional:       Appearance: Normal appearance.   HENT:      Head: Normocephalic and atraumatic.   Eyes:      Extraocular Movements: Extraocular movements intact.   Cardiovascular:      Pulses: Normal pulses. Skin warm and well perfused.  Pulmonary:      Effort: Pulmonary effort is normal. No respiratory distress.   Skin:     General: Skin is warm.   Psychiatric:         Mood and Affect: Mood  normal.     Spine Exam:    Normal gait without difficulty  Able to heel, toe, tandem gait without difficulty  Level shoulders and hips in even stance    Restricted L-spine ROM    No tenderness to palpation of L-spine    Straight leg raise test: negative    Sustained clonus: negative    LE Strength: 5/5 IP QUAD TA EHL GSC  LE Sensation: normal in L2-S1 distribution  LE reflexes: normal    Radiographic Examination/Diagnostics:  XR and CT personally viewed, independently interpreted and radiology report was reviewed.  X-ray of the lumbar spine demonstrates status post L5-S1 fusion  CT scan of the chest abdomen and pelvis from October 24 demonstrated fusion mass across L5-S1 disc space      IMPRESSION: Isaac Perez is a 49 year old male with history of L5-S1 fusion presenting with acute on chronic right-sided low back pain.  Radiographically no evidence of surgical complications, recommend symptomatic management.    PLAN:     We reviewed the patients history, symptoms, exam findings, and imaging today.  We had a detailed discussion outlining the etiology, anatomy, pathophysiology, and natural history of back pain. The typical management of this condition may include lifestyle modification, NSAIDs, physical therapy, oral steroids, epidural injections, neuromodulatory medications, and sometimes pain medications.  Based on our discussion today we would like to have the patient initiate our recommendations for continued conservative therapy in the treatment of their condition noted in the assessment section.     - Refrain from work for 2 weeks, focus on rest and PT  - Prescribed Meloxicam  - Referred to Physical Therapy: home exercise program, aerobic exercises, core strengthening and conditioning, possible manipulative therapy,  and modalities as indicated      FOLLOW-UP:  We will see him back in follow-up in one month, or sooner if any problems arise. Patient understands and agrees with plan.      Venu Frost MD   Orthopedic Spine Surgeon  [facility]   [address], [address], [place] [address]   [address] [place] [address] [id number] [place] [address].[facility]  [email or web address]  t: [phone]   f: [phone]        This note was dictated using Dragon software.  While it was briefly proofread prior to completion, some grammatical, spelling, and word choice errors due to dictation may still occur.

## 2025-02-28 PROBLEM — Z86.0101 PERSONAL HISTORY OF ADENOMATOUS AND SERRATED COLON POLYPS: Status: ACTIVE | Noted: 2025-02-28

## 2025-03-04 ENCOUNTER — TELEPHONE (OUTPATIENT)
Dept: ORTHOPEDICS CLINIC | Facility: CLINIC | Age: 50
End: 2025-03-04

## 2025-03-04 NOTE — TELEPHONE ENCOUNTER
Request: letter (Pending) extending/change time off from 3/3 to 3/10 (changed from 2 weeks to 3 weeks off work)    LOV: 02/17/25  Per LOV: \"- Refrain from work for 2 weeks, focus on rest and PT  - Prescribed Meloxicam  - Referred to Physical Therapy\"    Future Appointments   Date Time Provider Department Center   3/10/2025  8:40 AM Venu Frost MD [facility]

## 2025-03-04 NOTE — TELEPHONE ENCOUNTER
Patient has an appointment scheduled for 3/10. He needs his note for work extended until that date it  on 3/2. Please advise and upload to patients Mychart.

## 2025-03-10 ENCOUNTER — OFFICE VISIT (OUTPATIENT)
Dept: ORTHOPEDICS CLINIC | Facility: CLINIC | Age: 50
End: 2025-03-10
Payer: COMMERCIAL

## 2025-03-10 VITALS — WEIGHT: 235 LBS | HEIGHT: 68 IN | BODY MASS INDEX: 35.61 KG/M2

## 2025-03-10 DIAGNOSIS — G89.29 CHRONIC RIGHT-SIDED LOW BACK PAIN WITHOUT SCIATICA: Primary | ICD-10-CM

## 2025-03-10 DIAGNOSIS — M54.50 CHRONIC RIGHT-SIDED LOW BACK PAIN WITHOUT SCIATICA: Primary | ICD-10-CM

## 2025-03-10 PROCEDURE — 3008F BODY MASS INDEX DOCD: CPT | Performed by: STUDENT IN AN ORGANIZED HEALTH CARE EDUCATION/TRAINING PROGRAM

## 2025-03-10 PROCEDURE — G2211 COMPLEX E/M VISIT ADD ON: HCPCS | Performed by: STUDENT IN AN ORGANIZED HEALTH CARE EDUCATION/TRAINING PROGRAM

## 2025-03-10 PROCEDURE — 99214 OFFICE O/P EST MOD 30 MIN: CPT | Performed by: STUDENT IN AN ORGANIZED HEALTH CARE EDUCATION/TRAINING PROGRAM

## 2025-03-10 RX ORDER — METHOCARBAMOL 500 MG/1
500 TABLET, FILM COATED ORAL 4 TIMES DAILY
Qty: 30 TABLET | Refills: 0 | Status: SHIPPED | OUTPATIENT
Start: 2025-03-10

## 2025-03-10 RX ORDER — CYCLOBENZAPRINE HCL 10 MG
TABLET ORAL
COMMUNITY
Start: 2025-02-07

## 2025-03-10 NOTE — PROGRESS NOTES
[facility] - ORTHOPEDICS  [address] [place] [address], [address], [place] [address]  [address] [place] [address]  [phone]     FOLLOW-UP PATIENT VISIT    Name: Isaac Perez   MRN: [id number]  Date: 3/10/2025     CC: back pain with sciatica       INTERVAL HISTORY:   Isaac Perez is a 49 year old male  follow-up patient whom I have been treating conservatively. Patient returns today for reevaluation of back pain and sciatica .     Patient is a 49-year-old male presenting with predominantly right-sided low back pain with intermittent sciatica symptoms. Patient has history of L5-S1 fusion 20 years ago. No evidence of complications from surgery. Symptoms have waxed and waned over the years.     Patient was last seen in clinic approximately 1 month ago and referred to physical therapy.  Patient has had some improvement of symptoms but persistent muscle spasms.    Bowel and bladder symptoms: absent.    The patient has not had issues with balance and/or hand dexterity problems such as changes in penmanship or the use of buttons or zippers.    We have tried the following interventions thus far: PT    ROS: No fever/chills or other constitutional issues.    PE:   Vitals:    03/10/25 0839   Weight: 235 lb (106.6 kg)   Height: 5' 8\" (1.727 m)     Estimated body mass index is 35.73 kg/m² as calculated from the following:    Height as of this encounter: 5' 8\" (1.727 m).    Weight as of this encounter: 235 lb (106.6 kg).    On physical examination, he is awake, alert and oriented x 3 and in no acute distress. Mood, affect and language are normal. He appears well developed and well nourished.  He walks without a nonantalgic, nonmyelopathic, non-Trendelenburg gait. Motor strength testing of the lower extremities shows 5/5 strength in hip flexors, knee extensors, ankle dorsiflexors, toe extensor, and gastroc-soleus complex.   Sensation is intact to light touch L2-S1 distributions bilaterally.  Reflexes normal.     Radiographic Examination/Diagnostics:  CT personally viewed, independently interpreted and radiology report was reviewed.  CT scan of the chest abdomen and pelvis from October 24 demonstrated fusion mass across L5-S1 disc space, no significant stenosis    IMPRESSION: Isaac Perez is a 49 year old male with history of L5-S1 fusion presenting with acute on chronic right-sided low back pain.  Radiographically no evidence of surgical complications or recurrent stenosis. Patient has right hip tendinopathy.     PLAN:   - Continue PT  - Trial of Robaxin  - Refrain from working more than 8 hours for next month pending improvement in symptoms    FOLLOW-UP:  We will see him back in follow-up in one month, or sooner if any problems arise. Patient understands and agrees with plan.    Venu Frost MD  Orthopedic Spine Surgeon  [facility]   [address] [place] [address].[facility]  [email or web address]  t: [phone]   f: [phone]        This note was dictated using Dragon software.  While it was briefly proofread prior to completion, some grammatical, spelling, and word choice errors due to dictation may still occur.

## (undated) DEVICE — SKN PREP SPNG STKS PVP PNT STR: Brand: MEDLINE INDUSTRIES, INC.

## (undated) DEVICE — TIP COVER ACCESSORY

## (undated) DEVICE — FORCE BIPOLAR: Brand: ENDOWRIST

## (undated) DEVICE — ROBOTIC GENERAL: Brand: MEDLINE INDUSTRIES, INC.

## (undated) DEVICE — 3M™ IOBAN™ 2 ANTIMICROBIAL INCISE DRAPE 6648EZ: Brand: IOBAN™ 2

## (undated) DEVICE — GLOVE SUR 7.5 SENSICARE PI PIP GRN PWD F

## (undated) DEVICE — 40580 - THE PINK PAD - ADVANCED TRENDELENBURG POSITIONING KIT: Brand: 40580 - THE PINK PAD - ADVANCED TRENDELENBURG POSITIONING KIT

## (undated) DEVICE — SEAL

## (undated) DEVICE — MEGA NEEDLE DRIVER: Brand: ENDOWRIST

## (undated) DEVICE — COLUMN DRAPE

## (undated) DEVICE — LAPAROVUE VISIBILITY SYSTEM LAPAROSCOPIC SOLUTIONS: Brand: LAPAROVUE

## (undated) DEVICE — GLOVE SUR 6.5 SENSICARE PI PIP CRM PWD F

## (undated) DEVICE — COVER,LIGHT,CAMERA,HARD,1/PK,STRL: Brand: MEDLINE

## (undated) DEVICE — MONOPOLAR CURVED SCISSORS: Brand: ENDOWRIST

## (undated) DEVICE — ENDOPATH ULTRA VERESS INSUFFLATION NEEDLES WITH LUER LOCK CONNECTORS: Brand: ENDOPATH

## (undated) DEVICE — STERILE DRAPE FOR USE WITH SITUATE ROOM SCANNER: Brand: SITUATE

## (undated) DEVICE — BLADELESS OBTURATOR: Brand: WECK VISTA

## (undated) DEVICE — SUT MCRYL 4-0 18IN PS-2 ABSRB UD 19MM 3/8 CIR

## (undated) DEVICE — ADHESIVE SKIN TOP FOR WND CLSR DERMBND ADV

## (undated) DEVICE — ANTIBACTERIAL UNDYED BRAIDED (POLYGLACTIN 910), SYNTHETIC ABSORBABLE SUTURE: Brand: COATED VICRYL

## (undated) DEVICE — ARM DRAPE

## (undated) DEVICE — TRAY CATH 16FR F INCL BARDX IC COMPLT CARE

## (undated) DEVICE — GLOVE SUR 7 SENSICARE PI PIP CRM PWD F

## (undated) DEVICE — ABSORBABLE WOUND CLOSURE DEVICE: Brand: V-LOC 90

## (undated) DEVICE — DRAPE,TOP,102X53,STERILE: Brand: MEDLINE

## (undated) DEVICE — SYRINGE MED 10ML LL TIP W/O SFTY DISP

## (undated) NOTE — LETTER
AUTHORIZATION FOR SURGICAL OPERATION OR OTHER PROCEDURE    1.  I hereby authorize Dr. Hilary Chavez, and [facility] staff assigned to my case to perform the following operation and/or procedure at the [facility]:    ____________________________________ Time:  ________ A. M.  P.M.        Patient Name:  ______________________________________________________  (please print)      Patient signature:  ___________________________________________________             Relationship to Patient:

## (undated) NOTE — LETTER
11/20/2020          To Whom It May Concern:    Viviana Cheng is currently under my medical care. Injury occurred last Monday to right ankle. Patient seen in the office today for injury. Please excused patient from work 11/20/20 - 12/21/20.     If you requ

## (undated) NOTE — LETTER
Date: 3/4/2025    Patient Name: Isaac Perez          To Whom it may concern:    This letter has been written at the patient's request. The above patient was seen at [facility] for treatment of a medical condition.    This patient should be excused from attending work until 03/10/25      Sincerely,    Venu Frost MD  Orthopedic Spine Surgeon  [facility]   [address] [place] [address].[facility]  [email or web address]  t: [phone]   f: [phone]       Venu Frost MD

## (undated) NOTE — LETTER
AUTHORIZATION FOR SURGICAL OPERATION OR OTHER PROCEDURE    1.  I hereby [facility] Barreto Poster, [facility] to my case to perform the following operation and/or procedure at the [facility]:    ____________________________________ Time:  ________ A. M.  P.M.        Patient Name:  ______________________________________________________  (please print)      Patient signature:  ___________________________________________________             Relationship to Patient:

## (undated) NOTE — LETTER
Date: 3/10/2025    Patient Name: Isaac Perez          To Whom it may concern:    This letter has been written at the patient's request. The above patient was seen at [facility] for treatment of a medical condition.    Isaac was seen in my office today 3/10/25.  Due to a medical condition, he is able to work, but no more than 8 hours in one day. This will remain for 1 month. He will be reevaluated and more information will follow at that time.          Sincerely,      Venu Frost MD  Orthopedic Spine Surgeon  [facility]   [address] [place] [address], [address] [place] [address].[email or web address]  [email or web address]  t: [phone]   f: [phone]

## (undated) NOTE — LETTER
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

## (undated) NOTE — LETTER
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

## (undated) NOTE — LETTER
1/3/2025    Return to Work    Name: Isaac Perez        : 1975    To Whom It May Concern,    Isaac Perez had surgery on 2024 and is:    Able to return to school / work without restrictions on 2025.    If there are any further questions, regarding this patient's care, please contact the patient directly.    Sincerely,    Yu Richards PA-C

## (undated) NOTE — LETTER
Date: 12/16/2020    Patient Name: Vishnu Barraza          To Whom it may concern: This letter has been written at the patient's request. The above patient was seen at one of the [facility] locations for treatment of a medical condition.

## (undated) NOTE — LETTER
11/24/2021          To Whom It May Concern:    Kalina Lyman is currently under my medical care. Please excuse him from work 11/25/21 thru 11/29/21. If you require additional information please contact our office.         Sincerely,    Migel Rashid DPM

## (undated) NOTE — LETTER
AUTHORIZATION FOR SURGICAL OPERATION OR OTHER PROCEDURE    1.  I hereby authorize Dr. Philomena Herman, and [facility] staff assigned to my case to perform the following operation and/or procedure at the [facility]:    ____________________________________ ________ A. M.  P.M.        Patient Name:  ______________________________________________________  (please print)      Patient signature:  ___________________________________________________             Relationship to Patient:           []  Parent    Respon

## (undated) NOTE — LETTER
AUTHORIZATION FOR SURGICAL OPERATION OR OTHER PROCEDURE    1.  I hereby authorize Dr. Fiona Guerrier, and [facility] staff assigned to my case to perform the following operation and/or procedure at the [facility]:    ____________________________________ ________ A. M.  P.M.        Patient Name:  ______________________________________________________  (please print)      Patient signature:  ___________________________________________________             Relationship to Patient:           []  Parent    Respo

## (undated) NOTE — LETTER
2/18/2020          To Whom It May Concern:    Clementine Merlene is currently under my medical care. He was seen in our office today February 18, 2020. If you require additional information please contact our office.         Sincerely,    Liss Ordaz

## (undated) NOTE — LETTER
Date: 12/2/2024    Patient Name: Isaac Perez          To Whom it may concern:    This letter has been written at the patient's request. The above patient was seen at [facility] for treatment of a medical condition.    This patient should be excused from attending work/school from 12/3/2024 through 1/4/2025.    The patient may return to work/school on 1/5/2025 with no limitations.        Sincerely,    Skyler Robles MD

## (undated) NOTE — LETTER
Date: 2/17/2025    Patient Name: Isaac Perez          To Whom it may concern:    This letter has been written at the patient's request. The above patient was seen at [facility] for treatment of a medical condition.    This patient should be excused from attending work until 03/02/25      Sincerely,    Venu Frost MD  Orthopedic Spine Surgeon  [facility]   [address] [place] [address].[facility]  [email or web address]  t: [phone]   f: [phone]       Venu Frost MD

## (undated) NOTE — LETTER
9/24/2021          To Whom It May Concern:    Angel De La Cruz is currently under my medical care and may  return to work  at this time. Please excuse Matilda Jason until 10/29/2021, at which time we will see him in the office to re  Evaluate.    Activity is re

## (undated) NOTE — LETTER
Date: 2/2/2021    Patient Name: Richard Huang          To Whom it may concern: This letter has been written at the patient's request. The above patient was seen at one of the [facility] locations for treatment of a medical condition.

## (undated) NOTE — LETTER
7/29/2020          To Whom It May Concern:    Preston Berg is currently under my medical care due to a foot condition. Please excuse Yolande Zuniga from work on 7/30/2020  If you require additional information please contact our office.         Sincerely,    Sa

## (undated) NOTE — LETTER
8/4/2021          To Whom It May Concern:    Angel De La Cruz is currently under my medical care and may not return to work at this time. Please excuse Matilda Jason for 2 days 8/4/21 and 8/5/21, due to a foot condition.     If you require additional informatio

## (undated) NOTE — LETTER
10/22/24      RE  EVER PEREZ  [address] [place] [address]       To Whom It May Concern:    Ever Perez is a patient under my medical care.  He should remain on light duty, which includes lifting no more than 20 pounds, until his surgical procedure scheduled 1/3/25.    Thank you.      Jarad Carranza MD

## (undated) NOTE — LETTER
Date: 3/12/2021    Patient Name: Pierre Khan          To Whom it may concern: This letter has been written at the patient's request. The above patient was seen at the [facility] for treatment of a medical condition.       The patient ma

## (undated) NOTE — LETTER
8/26/2020          To Whom It May Concern:    Angel De La Cruz is currently under my medical care and may not return to work until 8/31/20. Activity is restricted as follows: Sedentary duties only.   If you require additional information please contact our o

## (undated) NOTE — LETTER
AUTHORIZATION FOR SURGICAL OPERATION OR OTHER PROCEDURE    1.  I hereby authorize Dr. Suha Plata, and [facility] staff assigned to my case to perform the following operation and/or procedure at the [facility]:    ____________________________________ Time:  ________ A. M.  P.M.        Patient Name:  ______________________________________________________  (please print)      Patient signature:  ___________________________________________________             Relationship to Patient:

## (undated) NOTE — LETTER
8/28/2020          To Whom It May Concern:    Christophe Benson is currently under my medical care and may not return to work until 8/31/20. Light/sedentary duty as of 8/31/20 until 9/16/20.     If you require additional information please contact our office

## (undated) NOTE — LETTER
5/19/2021          To Whom It May Concern:    Daniela Jacobson is currently under my medical care and may not return to work at this time, due to foot condition.     Please excuse Sherly Mccarthy, he may return to work on 5/26/21     If you require additional inform